# Patient Record
Sex: MALE | NOT HISPANIC OR LATINO | Employment: FULL TIME | URBAN - METROPOLITAN AREA
[De-identification: names, ages, dates, MRNs, and addresses within clinical notes are randomized per-mention and may not be internally consistent; named-entity substitution may affect disease eponyms.]

---

## 2023-03-29 ENCOUNTER — OFFICE VISIT (OUTPATIENT)
Dept: PRIMARY CARE | Facility: CLINIC | Age: 48
End: 2023-03-29
Payer: COMMERCIAL

## 2023-03-29 ENCOUNTER — LAB (OUTPATIENT)
Dept: LAB | Facility: LAB | Age: 48
End: 2023-03-29
Payer: COMMERCIAL

## 2023-03-29 VITALS
WEIGHT: 161.4 LBS | RESPIRATION RATE: 16 BRPM | HEART RATE: 85 BPM | HEIGHT: 65 IN | OXYGEN SATURATION: 98 % | BODY MASS INDEX: 26.89 KG/M2 | SYSTOLIC BLOOD PRESSURE: 111 MMHG | DIASTOLIC BLOOD PRESSURE: 75 MMHG

## 2023-03-29 DIAGNOSIS — Z12.11 COLON CANCER SCREENING: ICD-10-CM

## 2023-03-29 DIAGNOSIS — Z00.00 ANNUAL PHYSICAL EXAM: Primary | ICD-10-CM

## 2023-03-29 DIAGNOSIS — Z12.5 PROSTATE CANCER SCREENING: ICD-10-CM

## 2023-03-29 DIAGNOSIS — Z00.00 ANNUAL PHYSICAL EXAM: ICD-10-CM

## 2023-03-29 DIAGNOSIS — Z23 ENCOUNTER FOR IMMUNIZATION: ICD-10-CM

## 2023-03-29 LAB
ALANINE AMINOTRANSFERASE (SGPT) (U/L) IN SER/PLAS: 13 U/L (ref 10–52)
ANION GAP IN SER/PLAS: 14 MMOL/L (ref 10–20)
ASPARTATE AMINOTRANSFERASE (SGOT) (U/L) IN SER/PLAS: 14 U/L (ref 9–39)
CALCIUM (MG/DL) IN SER/PLAS: 9.8 MG/DL (ref 8.6–10.6)
CARBON DIOXIDE, TOTAL (MMOL/L) IN SER/PLAS: 29 MMOL/L (ref 21–32)
CHLORIDE (MMOL/L) IN SER/PLAS: 104 MMOL/L (ref 98–107)
CREATININE (MG/DL) IN SER/PLAS: 1.08 MG/DL (ref 0.5–1.3)
GFR MALE: 84 ML/MIN/1.73M2
GLUCOSE (MG/DL) IN SER/PLAS: 62 MG/DL (ref 74–99)
HEPATITIS C VIRUS AB PRESENCE IN SERUM: NONREACTIVE
POTASSIUM (MMOL/L) IN SER/PLAS: 4.4 MMOL/L (ref 3.5–5.3)
PROSTATE SPECIFIC ANTIGEN,SCREEN: 1.78 NG/ML (ref 0–4)
SODIUM (MMOL/L) IN SER/PLAS: 143 MMOL/L (ref 136–145)
THYROTROPIN (MIU/L) IN SER/PLAS BY DETECTION LIMIT <= 0.05 MIU/L: 0.62 MIU/L (ref 0.44–3.98)
UREA NITROGEN (MG/DL) IN SER/PLAS: 12 MG/DL (ref 6–23)

## 2023-03-29 PROCEDURE — 85027 COMPLETE CBC AUTOMATED: CPT

## 2023-03-29 PROCEDURE — 86803 HEPATITIS C AB TEST: CPT

## 2023-03-29 PROCEDURE — 84450 TRANSFERASE (AST) (SGOT): CPT

## 2023-03-29 PROCEDURE — 90471 IMMUNIZATION ADMIN: CPT | Performed by: FAMILY MEDICINE

## 2023-03-29 PROCEDURE — 84443 ASSAY THYROID STIM HORMONE: CPT

## 2023-03-29 PROCEDURE — 99386 PREV VISIT NEW AGE 40-64: CPT | Performed by: FAMILY MEDICINE

## 2023-03-29 PROCEDURE — 84153 ASSAY OF PSA TOTAL: CPT

## 2023-03-29 PROCEDURE — 36415 COLL VENOUS BLD VENIPUNCTURE: CPT

## 2023-03-29 PROCEDURE — 80048 BASIC METABOLIC PNL TOTAL CA: CPT

## 2023-03-29 PROCEDURE — 84460 ALANINE AMINO (ALT) (SGPT): CPT

## 2023-03-29 PROCEDURE — 4004F PT TOBACCO SCREEN RCVD TLK: CPT | Performed by: FAMILY MEDICINE

## 2023-03-29 PROCEDURE — 90715 TDAP VACCINE 7 YRS/> IM: CPT | Performed by: FAMILY MEDICINE

## 2023-03-29 ASSESSMENT — PATIENT HEALTH QUESTIONNAIRE - PHQ9
2. FEELING DOWN, DEPRESSED OR HOPELESS: NOT AT ALL
1. LITTLE INTEREST OR PLEASURE IN DOING THINGS: NOT AT ALL
SUM OF ALL RESPONSES TO PHQ9 QUESTIONS 1 AND 2: 0

## 2023-03-29 NOTE — PATIENT INSTRUCTIONS
Fasting labs.  Colonoscopy referral.  Nutritions referral.  Tetanus shot provided.    F/U 1 year: Annual physical.

## 2023-03-29 NOTE — PROGRESS NOTES
"Subjective   Patient ID: Giovanni Lockhart is a 48 y.o. male who presents for Annual Exam.    HPI   Initial visit.  Patient's health is described as good.  Regular dental visits: Yes.  Vision problems: No.  Corrective lenses: Yes.  Last eye exam within 1 year: No.  Hearing loss: No.  Requests audiology referral: No.  Immunizations up to date: No (needs tetanus)  Healthy diet: No.  Regular exercise: Yes.  Trying to lose weight: No (trying to gain weight).  Requests nutrition/weight loss referral: Yes.  Sexually active: Yes.  Using contraception: No.  Requests STD screening: No.  Colon cancer screening up to date: No.  Objective   /75   Pulse 85   Resp 16   Ht 1.638 m (5' 4.5\")   Wt 73.2 kg (161 lb 6.4 oz)   SpO2 98%   BMI 27.28 kg/m²   Physical Exam  Constitutional:       General: He is not in acute distress.     Appearance: Normal appearance.   HENT:      Head: Normocephalic.      Right Ear: Tympanic membrane normal.      Left Ear: Tympanic membrane normal.      Nose: Nose normal.      Mouth/Throat:      Pharynx: Oropharynx is clear. No oropharyngeal exudate or posterior oropharyngeal erythema.   Eyes:      Extraocular Movements: Extraocular movements intact.      Conjunctiva/sclera: Conjunctivae normal.      Pupils: Pupils are equal, round, and reactive to light.   Neck:      Vascular: No carotid bruit.   Cardiovascular:      Rate and Rhythm: Normal rate and regular rhythm.      Heart sounds: Normal heart sounds. No murmur heard.     No friction rub. No gallop.   Pulmonary:      Effort: Pulmonary effort is normal.      Breath sounds: Normal breath sounds. No wheezing, rhonchi or rales.   Abdominal:      General: Bowel sounds are normal. There is no distension.      Palpations: Abdomen is soft. There is no mass.      Tenderness: There is no abdominal tenderness. There is no guarding or rebound.   Lymphadenopathy:      Cervical: No cervical adenopathy.   Skin:     Coloration: Skin is not jaundiced or pale. "   Neurological:      General: No focal deficit present.      Mental Status: He is oriented to person, place, and time.   Psychiatric:         Mood and Affect: Mood normal.         Behavior: Behavior normal.     Assessment/Plan   Diagnoses and all orders for this visit:  Annual physical exam  -     CBC; Future  -     Basic Metabolic Panel; Future  -     Lipid Panel; Future  -     Alanine Aminotransferase; Future  -     Aspartate Aminotransferase; Future  -     TSH with reflex to Free T4 if abnormal; Future  -     Hepatitis C Antibody; Future  -     Referral to Nutrition Services; Future  Prostate cancer screening  -     Prostate Specific Antigen, Screen; Future  Colon cancer screening  -     Colonoscopy; Future  Encounter for immunization  -     Tdap vaccine, age 10 years and older  (BOOSTRIX)    Fasting labs.  Colonoscopy referral.  Nutritions referral.  Tetanus shot provided.    F/U 1 year: Annual physical.

## 2023-03-30 LAB
ERYTHROCYTE DISTRIBUTION WIDTH (RATIO) BY AUTOMATED COUNT: 13.4 % (ref 11.5–14.5)
ERYTHROCYTE MEAN CORPUSCULAR HEMOGLOBIN CONCENTRATION (G/DL) BY AUTOMATED: 32.7 G/DL (ref 32–36)
ERYTHROCYTE MEAN CORPUSCULAR VOLUME (FL) BY AUTOMATED COUNT: 96 FL (ref 80–100)
ERYTHROCYTES (10*6/UL) IN BLOOD BY AUTOMATED COUNT: 4.06 X10E12/L (ref 4.5–5.9)
HEMATOCRIT (%) IN BLOOD BY AUTOMATED COUNT: 39.1 % (ref 41–52)
HEMOGLOBIN (G/DL) IN BLOOD: 12.8 G/DL (ref 13.5–17.5)
LEUKOCYTES (10*3/UL) IN BLOOD BY AUTOMATED COUNT: 8.1 X10E9/L (ref 4.4–11.3)
NRBC (PER 100 WBCS) BY AUTOMATED COUNT: 0 /100 WBC (ref 0–0)
PLATELETS (10*3/UL) IN BLOOD AUTOMATED COUNT: 237 X10E9/L (ref 150–450)

## 2023-04-04 DIAGNOSIS — D64.9 ANEMIA, UNSPECIFIED TYPE: Primary | ICD-10-CM

## 2023-04-12 ENCOUNTER — LAB (OUTPATIENT)
Dept: LAB | Facility: LAB | Age: 48
End: 2023-04-12
Payer: COMMERCIAL

## 2023-04-12 DIAGNOSIS — D64.9 ANEMIA, UNSPECIFIED TYPE: ICD-10-CM

## 2023-04-12 DIAGNOSIS — Z00.00 ANNUAL PHYSICAL EXAM: ICD-10-CM

## 2023-04-12 LAB
CHOLESTEROL (MG/DL) IN SER/PLAS: 188 MG/DL (ref 0–199)
CHOLESTEROL IN HDL (MG/DL) IN SER/PLAS: 47.7 MG/DL
CHOLESTEROL/HDL RATIO: 3.9
ERYTHROCYTE DISTRIBUTION WIDTH (RATIO) BY AUTOMATED COUNT: 13.6 % (ref 11.5–14.5)
ERYTHROCYTE MEAN CORPUSCULAR HEMOGLOBIN CONCENTRATION (G/DL) BY AUTOMATED: 32.8 G/DL (ref 32–36)
ERYTHROCYTE MEAN CORPUSCULAR VOLUME (FL) BY AUTOMATED COUNT: 96 FL (ref 80–100)
ERYTHROCYTES (10*6/UL) IN BLOOD BY AUTOMATED COUNT: 4.1 X10E12/L (ref 4.5–5.9)
FERRITIN (UG/LL) IN SER/PLAS: 187 UG/L (ref 20–300)
HEMATOCRIT (%) IN BLOOD BY AUTOMATED COUNT: 39.3 % (ref 41–52)
HEMOGLOBIN (G/DL) IN BLOOD: 12.9 G/DL (ref 13.5–17.5)
IRON (UG/DL) IN SER/PLAS: 80 UG/DL (ref 35–150)
IRON BINDING CAPACITY (UG/DL) IN SER/PLAS: 295 UG/DL (ref 240–445)
IRON SATURATION (%) IN SER/PLAS: 27 % (ref 25–45)
LDL: 117 MG/DL (ref 0–99)
LEUKOCYTES (10*3/UL) IN BLOOD BY AUTOMATED COUNT: 7.9 X10E9/L (ref 4.4–11.3)
NRBC (PER 100 WBCS) BY AUTOMATED COUNT: 0 /100 WBC (ref 0–0)
PLATELETS (10*3/UL) IN BLOOD AUTOMATED COUNT: 247 X10E9/L (ref 150–450)
TRIGLYCERIDE (MG/DL) IN SER/PLAS: 116 MG/DL (ref 0–149)
VLDL: 23 MG/DL (ref 0–40)

## 2023-04-12 PROCEDURE — 83540 ASSAY OF IRON: CPT

## 2023-04-12 PROCEDURE — 36415 COLL VENOUS BLD VENIPUNCTURE: CPT

## 2023-04-12 PROCEDURE — 82746 ASSAY OF FOLIC ACID SERUM: CPT

## 2023-04-12 PROCEDURE — 82607 VITAMIN B-12: CPT

## 2023-04-12 PROCEDURE — 83550 IRON BINDING TEST: CPT

## 2023-04-12 PROCEDURE — 82728 ASSAY OF FERRITIN: CPT

## 2023-04-12 PROCEDURE — 85027 COMPLETE CBC AUTOMATED: CPT

## 2023-04-12 PROCEDURE — 80061 LIPID PANEL: CPT

## 2023-04-13 LAB
COBALAMIN (VITAMIN B12) (PG/ML) IN SER/PLAS: 385 PG/ML (ref 211–911)
FOLATE (NG/ML) IN SER/PLAS: 10 NG/ML

## 2023-05-03 ENCOUNTER — HOSPITAL ENCOUNTER (OUTPATIENT)
Dept: DATA CONVERSION | Facility: HOSPITAL | Age: 48
End: 2023-05-03
Attending: STUDENT IN AN ORGANIZED HEALTH CARE EDUCATION/TRAINING PROGRAM | Admitting: STUDENT IN AN ORGANIZED HEALTH CARE EDUCATION/TRAINING PROGRAM

## 2023-05-03 DIAGNOSIS — D12.2 BENIGN NEOPLASM OF ASCENDING COLON: ICD-10-CM

## 2023-05-03 DIAGNOSIS — Z12.11 ENCOUNTER FOR SCREENING FOR MALIGNANT NEOPLASM OF COLON: ICD-10-CM

## 2023-05-03 DIAGNOSIS — F17.290 NICOTINE DEPENDENCE, OTHER TOBACCO PRODUCT, UNCOMPLICATED: ICD-10-CM

## 2023-05-03 DIAGNOSIS — D12.4 BENIGN NEOPLASM OF DESCENDING COLON: ICD-10-CM

## 2023-05-03 DIAGNOSIS — D64.9 ANEMIA, UNSPECIFIED: ICD-10-CM

## 2023-05-03 DIAGNOSIS — Z88.0 ALLERGY STATUS TO PENICILLIN: ICD-10-CM

## 2023-05-11 LAB
COMPLETE PATHOLOGY REPORT: NORMAL
CONVERTED CLINICAL DIAGNOSIS-HISTORY: NORMAL
CONVERTED FINAL DIAGNOSIS: NORMAL
CONVERTED FINAL REPORT PDF LINK TO COPY AND PASTE: NORMAL
CONVERTED GROSS DESCRIPTION: NORMAL

## 2023-10-02 ENCOUNTER — OFFICE VISIT (OUTPATIENT)
Dept: PRIMARY CARE | Facility: CLINIC | Age: 48
End: 2023-10-02
Payer: COMMERCIAL

## 2023-10-02 VITALS
BODY MASS INDEX: 27.56 KG/M2 | TEMPERATURE: 97.1 F | DIASTOLIC BLOOD PRESSURE: 83 MMHG | RESPIRATION RATE: 16 BRPM | HEART RATE: 69 BPM | WEIGHT: 165.4 LBS | OXYGEN SATURATION: 98 % | HEIGHT: 65 IN | SYSTOLIC BLOOD PRESSURE: 130 MMHG

## 2023-10-02 DIAGNOSIS — R31.0 GROSS HEMATURIA: Primary | ICD-10-CM

## 2023-10-02 LAB
POC APPEARANCE, URINE: CLEAR
POC BILIRUBIN, URINE: NEGATIVE
POC BLOOD, URINE: ABNORMAL
POC COLOR, URINE: YELLOW
POC GLUCOSE, URINE: NEGATIVE MG/DL
POC KETONES, URINE: NEGATIVE MG/DL
POC LEUKOCYTES, URINE: NEGATIVE
POC NITRITE,URINE: NEGATIVE
POC PH, URINE: 7 PH
POC PROTEIN, URINE: NEGATIVE MG/DL
POC SPECIFIC GRAVITY, URINE: 1.01
POC UROBILINOGEN, URINE: 0.2 EU/DL

## 2023-10-02 PROCEDURE — 4004F PT TOBACCO SCREEN RCVD TLK: CPT | Performed by: FAMILY MEDICINE

## 2023-10-02 PROCEDURE — 99213 OFFICE O/P EST LOW 20 MIN: CPT | Performed by: FAMILY MEDICINE

## 2023-10-02 PROCEDURE — 81003 URINALYSIS AUTO W/O SCOPE: CPT | Performed by: FAMILY MEDICINE

## 2023-10-02 NOTE — PROGRESS NOTES
"Subjective   Patient ID: Giovanni Lockhart is a 48 y.o. male who presents for Blood in Urine.    HPI  Bright red blood in urine this morning.  2-3 drips on the floor.  \"Something\" came out and went into the toilet.  Looked like a scab, but not sure what color it was.   No blood or anything in subsequent urines.  Last PSA and GFR normal.    Review of Systems  No other complaints.     Objective   /83   Pulse 69   Temp 36.2 °C (97.1 °F)   Resp 16   Ht 1.638 m (5' 4.5\")   Wt 75 kg (165 lb 6.4 oz)   SpO2 98%   BMI 27.95 kg/m²     Physical Exam  Constitutional:       General: He is not in acute distress.     Appearance: Normal appearance.   Eyes:      Conjunctiva/sclera: Conjunctivae normal.   Abdominal:      Palpations: Abdomen is soft.      Tenderness: There is no abdominal tenderness. There is no right CVA tenderness or left CVA tenderness.   Skin:     Coloration: Skin is not jaundiced or pale.   Neurological:      Mental Status: He is oriented to person, place, and time.   Psychiatric:         Mood and Affect: Mood normal.         Behavior: Behavior normal.           Component  Ref Range & Units 11:15   POC Color, Urine  Straw, Yellow, Light-Yellow Yellow   POC Appearance, Urine  Clear Clear   POC Specific Gravity, Urine  1.005 - 1.035 1.010   POC PH, Urine  No Reference Range Established PH 7.0   POC Protein, Urine  NEGATIVE, 30 (1+) mg/dl NEGATIVE   POC Glucose, Urine  NEGATIVE mg/dl NEGATIVE   POC Blood, Urine  NEGATIVE SMALL (1+) Abnormal    POC Ketones, Urine  NEGATIVE mg/dl NEGATIVE   POC Bilirubin, Urine  NEGATIVE NEGATIVE   POC Urobilinogen, Urine  0.2, 1.0 EU/DL 0.2   Poc Nitrate, Urine  NEGATIVE NEGATIVE   POC Leukocytes, Urine  NEGATIVE NEGATIVE              Specimen Collected: 10/02/23 11:15           Assessment/Plan   Diagnoses and all orders for this visit:  Gross hematuria  -     POCT UA Automated manually resulted  -     Urine Culture  -     CT urography w 3D volume rendered imaging; Future  -  "    Referral to Urology; Future    Urine culture.  CT urogram.  Urology referral.    F/U 5 months: Annual wellness visit.

## 2023-10-03 LAB — BACTERIA UR CULT: NORMAL

## 2023-10-05 DIAGNOSIS — R31.0 GROSS HEMATURIA: Primary | ICD-10-CM

## 2024-10-11 ENCOUNTER — OFFICE VISIT (OUTPATIENT)
Dept: URGENT CARE | Age: 49
End: 2024-10-11
Payer: COMMERCIAL

## 2024-10-11 ENCOUNTER — APPOINTMENT (OUTPATIENT)
Dept: URGENT CARE | Age: 49
End: 2024-10-11
Payer: COMMERCIAL

## 2024-10-11 VITALS
SYSTOLIC BLOOD PRESSURE: 135 MMHG | DIASTOLIC BLOOD PRESSURE: 76 MMHG | OXYGEN SATURATION: 96 % | HEART RATE: 82 BPM | TEMPERATURE: 99.2 F

## 2024-10-11 DIAGNOSIS — R60.0 SUBMANDIBULAR GLAND SWELLING: ICD-10-CM

## 2024-10-11 DIAGNOSIS — R59.0 CERVICAL LYMPHADENOPATHY: Primary | ICD-10-CM

## 2024-10-11 LAB
POC RAPID MONO: NEGATIVE
POC RAPID STREP: NEGATIVE

## 2024-10-11 RX ORDER — DOXYCYCLINE 100 MG/1
100 CAPSULE ORAL 2 TIMES DAILY
Qty: 20 CAPSULE | Refills: 0 | Status: SHIPPED | OUTPATIENT
Start: 2024-10-11 | End: 2024-10-21

## 2024-10-11 NOTE — PROGRESS NOTES
Subjective   Patient ID: Giovanni Lockhart Jr. is a 49 y.o. male. They present today with a chief complaint of Illness.    History of Present Illness  Patient presents with swollen lymph nodes.  States it hurts to open his jaw all the way.  Denies mouth/tooth pain.  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  C/o fatigue.    Past Medical History  Allergies as of 10/11/2024 - Reviewed 10/11/2024   Allergen Reaction Noted    Ampicillin Hives and Unknown 03/23/2016    Penicillins Other 03/29/2023       (Not in a hospital admission)       History reviewed. No pertinent past medical history.    Past Surgical History:   Procedure Laterality Date    LIP REPAIR      as a child        reports that he has been smoking cigars. He started smoking about 25 years ago. He has never used smokeless tobacco. He reports current alcohol use.    Review of Systems  Review of Systems                               Objective    Vitals:    10/11/24 1607   BP: 135/76   Pulse: 82   Temp: 37.3 °C (99.2 °F)   SpO2: 96%     No LMP for male patient.    Physical Exam  CONSTITUTIONAL: The general appearance and condition of the patient were examined.  Level of distress, nutrition, external development abnormality, and general behavior were noted.  No abnormal findings.  Vital signs as documented.      EYES: Patient examined for extra ocular movements, symmetry of pupils, and reactivity to light.        ENT: External ears: left ear normal ; right ear normal. Bilateral ears have bulging TM's.  Normal external ear exam.  Erythema with pebbling to throat.         Lymph: Bilateral cervical lymph edema.  Submandibular lymph node swelling.     CARDIOVASCULAR: The patient's heart examined for regular rate and rhythm and presence of murmurs. Note taken of any tachycardia, bradycardia or any irregular rhythm.  No abnormal findings.        RESPIRATORY/LUNGS: Chest examined for equal movement, bilaterally.  Lungs examined for equality of breath  sounds. Presence or absence of rales noted bilaterally.  Examined for the presence of diffuse or scattered wheezes.  No abnormal findings.     Procedures    Point of Care Test & Imaging Results from this visit  No results found for this visit on 10/11/24.   No results found.    Diagnostic study results (if any) were reviewed by MARIANELA Quiles.    Assessment/Plan   Allergies, medications, history, and pertinent labs/EKGs/Imaging reviewed by MARIANELA Quiles.     Medical Decision Making    At time of discharge patient was clinically well-appearing and HDS for outpatient management. The patient and/or family was educated regarding diagnosis, supportive care, OTC and Rx medications. The patient and/or family was given the opportunity to ask questions prior to discharge.  They verbalized understanding of my discussion of the plans for treatment, expected course, indications to return to  or seek further evaluation in ED, and the need for timely follow up as directed.   They were provided with a work/school excuse if requested.      Orders and Diagnoses  There are no diagnoses linked to this encounter.    Medical Admin Record      Patient disposition: Home    Electronically signed by MARIANELA Quiles  4:09 PM

## 2024-10-14 ENCOUNTER — OFFICE VISIT (OUTPATIENT)
Dept: PRIMARY CARE | Facility: CLINIC | Age: 49
End: 2024-10-14
Payer: COMMERCIAL

## 2024-10-14 VITALS
HEART RATE: 77 BPM | BODY MASS INDEX: 27.32 KG/M2 | SYSTOLIC BLOOD PRESSURE: 136 MMHG | OXYGEN SATURATION: 98 % | HEIGHT: 65 IN | DIASTOLIC BLOOD PRESSURE: 64 MMHG | WEIGHT: 164 LBS | RESPIRATION RATE: 16 BRPM

## 2024-10-14 DIAGNOSIS — R59.0 SUBMANDIBULAR LYMPHADENOPATHY: Primary | ICD-10-CM

## 2024-10-14 DIAGNOSIS — R68.84 JAW PAIN: ICD-10-CM

## 2024-10-14 PROCEDURE — 99214 OFFICE O/P EST MOD 30 MIN: CPT | Performed by: FAMILY MEDICINE

## 2024-10-14 PROCEDURE — 3008F BODY MASS INDEX DOCD: CPT | Performed by: FAMILY MEDICINE

## 2024-10-14 RX ORDER — NAPROXEN 500 MG/1
500 TABLET ORAL 2 TIMES DAILY PRN
Qty: 30 TABLET | Refills: 0 | Status: SHIPPED | OUTPATIENT
Start: 2024-10-14 | End: 2024-10-22 | Stop reason: HOSPADM

## 2024-10-14 RX ORDER — CEFDINIR 300 MG/1
300 CAPSULE ORAL 2 TIMES DAILY
Qty: 14 CAPSULE | Refills: 0 | Status: SHIPPED | OUTPATIENT
Start: 2024-10-14 | End: 2024-10-22 | Stop reason: HOSPADM

## 2024-10-14 NOTE — PATIENT INSTRUCTIONS
Discussed DDx including reactive lymphadenopathy vs parotid etiology vs bony etiology vs TMJ.  Change Doxycycline to Cefdinir.  Naprosyn as needed (avoid other NSAIDs while taking Naprosyn).  Consider warm compression right jaw as needed.  Will consider imaging (ie lymph node vs x-ray mandible) if symptoms persist.  Call, send message through [employer], or return to office prn if these symptoms worsen or fail to improve as anticipated.      Schedule annual wellness visit.

## 2024-10-14 NOTE — PROGRESS NOTES
"Subjective   Patient ID: Giovanni Lockhart Jr. is a 49 y.o. male who presents for Swollen Glands.    HPI   Right submandibular lymph node x 6 days, increased in size since onset.  Went to urgent care 3 days ago, negative strep and mono test, Tx w/Doxy (taking as directed).  No improvement since urgent care visit.  Allergic to PCN (hives, no anaphylaxis).  Reports right mandible pain only when opening mouth to eat.  No issues when opening mouth to talk.  No jaw clicking or popping.  No dental pain.  Has slight cough.  Had rhinorrhea (completely resolved).  No nasal congestion, post nasal gtt, sinus pressure, sore throat, SOB, wheeze, fever, chills, loss of smell or taste, nausea, vomiting, diarrhea, headaches, body aches.  Tried Aleve, Motrin.  Negative home covid test 2 days ago.    Review of Systems  No other complaints.     Objective   /64   Pulse 77   Resp 16   Ht 1.638 m (5' 4.5\")   Wt 74.4 kg (164 lb)   SpO2 98%   BMI 27.72 kg/m²     Physical Exam  Constitutional:       General: He is not in acute distress.     Appearance: He is overweight.   HENT:      Head: Normocephalic.      Comments: No TMJ tenderness, No mandibular tenderness, No parotid gland tenderness     Right Ear: Tympanic membrane normal.      Left Ear: Tympanic membrane normal.      Mouth/Throat:      Pharynx: Oropharynx is clear. No oropharyngeal exudate or posterior oropharyngeal erythema.      Comments: Unable to fully open mouth, No hot potato voice  Neck:      Comments: Mildly tender right submandibular LAD  Cardiovascular:      Rate and Rhythm: Normal rate and regular rhythm.      Heart sounds: Normal heart sounds. No murmur heard.     No friction rub. No gallop.   Pulmonary:      Effort: Pulmonary effort is normal.      Breath sounds: Normal breath sounds. No wheezing, rhonchi or rales.   Neurological:      Mental Status: He is oriented to person, place, and time.   Psychiatric:         Mood and Affect: Mood normal.         Behavior: " Behavior normal.     Assessment/Plan   Diagnoses and all orders for this visit:  Submandibular lymphadenopathy  -     cefdinir (Omnicef) 300 mg capsule; Take 1 capsule (300 mg) by mouth 2 times a day for 7 days.  Jaw pain  -     naproxen (Naprosyn) 500 mg tablet; Take 1 tablet (500 mg) by mouth 2 times a day as needed for mild pain (1 - 3) or moderate pain (4 - 6).    Discussed DDx including reactive lymphadenopathy vs parotid etiology vs bony etiology vs TMJ.  Change Doxycycline to Cefdinir.  Naprosyn as needed (avoid other NSAIDs while taking Naprosyn).  Consider warm compression right jaw as needed.  Will consider imaging (ie lymph node vs x-ray mandible) if symptoms persist.  Call, send message through [employer], or return to office prn if these symptoms worsen or fail to improve as anticipated.      Schedule annual wellness visit.

## 2024-10-16 ENCOUNTER — TELEMEDICINE (OUTPATIENT)
Dept: PRIMARY CARE | Facility: CLINIC | Age: 49
End: 2024-10-16
Payer: COMMERCIAL

## 2024-10-16 DIAGNOSIS — K11.20 PAROTIDITIS: Primary | ICD-10-CM

## 2024-10-16 PROCEDURE — 99213 OFFICE O/P EST LOW 20 MIN: CPT | Performed by: NURSE PRACTITIONER

## 2024-10-16 ASSESSMENT — ENCOUNTER SYMPTOMS
FATIGUE: 0
VOMITING: 0
HEADACHES: 0
LIGHT-HEADEDNESS: 0
FEVER: 0
CHEST TIGHTNESS: 0
SHORTNESS OF BREATH: 0
FACIAL SWELLING: 1
APPETITE CHANGE: 0
ACTIVITY CHANGE: 0
NAUSEA: 0
DIZZINESS: 0
TROUBLE SWALLOWING: 0

## 2024-10-16 NOTE — PATIENT INSTRUCTIONS
Pleasure meeting with you today!    Let me know if you need anything.     Please send me a MyChart message if you have any questions or concerns.  FOR NON URGENT questions only.  Allow up to 72 hours for response.     If you have prescription issues or other questions you can email   Andrei Blackburn,  Digital Health Coordinator, at   [email or web address]

## 2024-10-16 NOTE — PROGRESS NOTES
Subjective   Patient ID: Giovanni Lockhart Jr. is a 49 y.o. male who presents for No chief complaint on file..    Right sided jaw/chin/neck area    Was seen by UC; testing was negative for strep etc, started on doxycycline, then saw PCP on 10/14/2024 who changed antibiotic, patient concerned because he still has swelling  PCP plan 10/14/24  Discussed DDx including reactive lymphadenopathy vs parotid etiology vs bony etiology vs TMJ.  Change Doxycycline to Cefdinir.  Naprosyn as needed (avoid other NSAIDs while taking Naprosyn).  Consider warm compression right jaw as needed.  Will consider imaging (ie lymph node vs x-ray mandible) if symptoms persist.             Review of Systems   Constitutional:  Negative for activity change, appetite change, fatigue and fever.   HENT:  Positive for facial swelling. Negative for trouble swallowing.    Respiratory:  Negative for chest tightness and shortness of breath.    Cardiovascular:  Negative for chest pain.   Gastrointestinal:  Negative for nausea and vomiting.   Neurological:  Negative for dizziness, light-headedness and headaches.       Objective   There were no vitals taken for this visit.    Physical Exam  Constitutional:       General: He is not in acute distress.     Appearance: Normal appearance. He is not ill-appearing.      Comments: On Demand Virtual Visit Patient Consent     An interactive audio and video telecommunication system which permits real time communications between the patient (at the originating site) and provider (at the distant site) was utilized to provide this telehealth service.   Verbal consent was requested and obtained from Giovanni Lockhart Jr. (or parent if under 18) on this date, 03/27/24 for a telehealth visit.   I have verbally confirmed with Giovanni Lockhart Jr. (or parent if under 18) that they are physically located in the [facility] during this virtual visit.    I performed this visit using realtime telehealth tools, including an audio/video OR  telephone connection between the patient listed who was located [place] the [place] and myself, Ayo Toledo CNP (licensed in the [facility]).  At the start of the visit, I introduced myself as Ayo Toledo, Nurse practitioner and verified the patients name, , and current physical location.    If they were currently outside of the state of [place], the visit was ended and the patient was referred to alternative means for evaluation and treatment.   The patient was made aware of the limitations of the telehealth visit.  They will not be physically examined and all issues may not be appropriate for a telehealth visit.  If necessary, an in person referral will be made.       DISCLAIMER:   In preparing for this visit and writing this note, I reviewed previous electronic medical records (labs, imaging and medical charts) available.  Significant findings which helped in decision making are recorded in this encounter charting.    Telemedicine appropriate evaluation completed.  Unable to perform complete physical exam due to virtual visit, patient was visualized on interactive video.      HENT:      Head:      Jaw: Swelling (right) present.   Pulmonary:      Effort: Pulmonary effort is normal.   Neurological:      Mental Status: He is alert and oriented to person, place, and time.         Assessment/Plan   Diagnoses and all orders for this visit:  Parotiditis  -complete previously prescribed antibiotic  Apply warm compress  Gentle massage to the area  Such on sour candy/lemon candy    Follow up with PCP if symptoms persist  Written education [facility] in [facility]

## 2024-10-18 ENCOUNTER — APPOINTMENT (OUTPATIENT)
Dept: RADIOLOGY | Facility: HOSPITAL | Age: 49
End: 2024-10-18
Payer: COMMERCIAL

## 2024-10-18 ENCOUNTER — HOSPITAL ENCOUNTER (EMERGENCY)
Facility: HOSPITAL | Age: 49
Discharge: HOME | End: 2024-10-18
Payer: COMMERCIAL

## 2024-10-18 ENCOUNTER — TELEMEDICINE (OUTPATIENT)
Dept: PRIMARY CARE | Facility: CLINIC | Age: 49
End: 2024-10-18
Payer: COMMERCIAL

## 2024-10-18 VITALS
TEMPERATURE: 98.6 F | BODY MASS INDEX: 27.88 KG/M2 | OXYGEN SATURATION: 97 % | SYSTOLIC BLOOD PRESSURE: 132 MMHG | RESPIRATION RATE: 18 BRPM | WEIGHT: 165 LBS | HEART RATE: 74 BPM | DIASTOLIC BLOOD PRESSURE: 96 MMHG

## 2024-10-18 DIAGNOSIS — K04.7 DENTAL ABSCESS: Primary | ICD-10-CM

## 2024-10-18 DIAGNOSIS — R25.2 TRISMUS: Primary | ICD-10-CM

## 2024-10-18 DIAGNOSIS — R22.1 LOCALIZED SWELLING, MASS AND LUMP, NECK: ICD-10-CM

## 2024-10-18 LAB
ALBUMIN SERPL BCP-MCNC: 4.1 G/DL (ref 3.4–5)
ALP SERPL-CCNC: 98 U/L (ref 33–120)
ALT SERPL W P-5'-P-CCNC: 15 U/L (ref 10–52)
ANION GAP SERPL CALC-SCNC: 11 MMOL/L (ref 10–20)
AST SERPL W P-5'-P-CCNC: 20 U/L (ref 9–39)
BASOPHILS # BLD AUTO: 0.01 X10*3/UL (ref 0–0.1)
BASOPHILS NFR BLD AUTO: 0.1 %
BILIRUB SERPL-MCNC: 0.3 MG/DL (ref 0–1.2)
BUN SERPL-MCNC: 12 MG/DL (ref 6–23)
CALCIUM SERPL-MCNC: 9 MG/DL (ref 8.6–10.3)
CHLORIDE SERPL-SCNC: 101 MMOL/L (ref 98–107)
CO2 SERPL-SCNC: 30 MMOL/L (ref 21–32)
CREAT SERPL-MCNC: 1.01 MG/DL (ref 0.5–1.3)
EGFRCR SERPLBLD CKD-EPI 2021: >90 ML/MIN/1.73M*2
EOSINOPHIL # BLD AUTO: 0.21 X10*3/UL (ref 0–0.7)
EOSINOPHIL NFR BLD AUTO: 2.1 %
ERYTHROCYTE [DISTWIDTH] IN BLOOD BY AUTOMATED COUNT: 13.2 % (ref 11.5–14.5)
GLUCOSE SERPL-MCNC: 77 MG/DL (ref 74–99)
HCT VFR BLD AUTO: 36 % (ref 41–52)
HGB BLD-MCNC: 12.3 G/DL (ref 13.5–17.5)
IMM GRANULOCYTES # BLD AUTO: 0.04 X10*3/UL (ref 0–0.7)
IMM GRANULOCYTES NFR BLD AUTO: 0.4 % (ref 0–0.9)
LYMPHOCYTES # BLD AUTO: 2.68 X10*3/UL (ref 1.2–4.8)
LYMPHOCYTES NFR BLD AUTO: 27.3 %
MCH RBC QN AUTO: 32.7 PG (ref 26–34)
MCHC RBC AUTO-ENTMCNC: 34.2 G/DL (ref 32–36)
MCV RBC AUTO: 96 FL (ref 80–100)
MONOCYTES # BLD AUTO: 0.97 X10*3/UL (ref 0.1–1)
MONOCYTES NFR BLD AUTO: 9.9 %
NEUTROPHILS # BLD AUTO: 5.92 X10*3/UL (ref 1.2–7.7)
NEUTROPHILS NFR BLD AUTO: 60.2 %
NRBC BLD-RTO: 0 /100 WBCS (ref 0–0)
PLATELET # BLD AUTO: 301 X10*3/UL (ref 150–450)
POTASSIUM SERPL-SCNC: 3.8 MMOL/L (ref 3.5–5.3)
PROT SERPL-MCNC: 7.7 G/DL (ref 6.4–8.2)
RBC # BLD AUTO: 3.76 X10*6/UL (ref 4.5–5.9)
SODIUM SERPL-SCNC: 138 MMOL/L (ref 136–145)
WBC # BLD AUTO: 9.8 X10*3/UL (ref 4.4–11.3)

## 2024-10-18 PROCEDURE — 2500000001 HC RX 250 WO HCPCS SELF ADMINISTERED DRUGS (ALT 637 FOR MEDICARE OP): Performed by: PHYSICIAN ASSISTANT

## 2024-10-18 PROCEDURE — 99214 OFFICE O/P EST MOD 30 MIN: CPT | Performed by: FAMILY MEDICINE

## 2024-10-18 PROCEDURE — 70491 CT SOFT TISSUE NECK W/DYE: CPT

## 2024-10-18 PROCEDURE — 2550000001 HC RX 255 CONTRASTS: Performed by: PHYSICIAN ASSISTANT

## 2024-10-18 PROCEDURE — 99284 EMERGENCY DEPT VISIT MOD MDM: CPT | Mod: 25

## 2024-10-18 PROCEDURE — 96375 TX/PRO/DX INJ NEW DRUG ADDON: CPT | Mod: 59

## 2024-10-18 PROCEDURE — 70491 CT SOFT TISSUE NECK W/DYE: CPT | Performed by: RADIOLOGY

## 2024-10-18 PROCEDURE — 96374 THER/PROPH/DIAG INJ IV PUSH: CPT | Mod: 59

## 2024-10-18 PROCEDURE — 85025 COMPLETE CBC W/AUTO DIFF WBC: CPT | Performed by: PHYSICIAN ASSISTANT

## 2024-10-18 PROCEDURE — 2500000004 HC RX 250 GENERAL PHARMACY W/ HCPCS (ALT 636 FOR OP/ED): Performed by: PHYSICIAN ASSISTANT

## 2024-10-18 PROCEDURE — 80053 COMPREHEN METABOLIC PANEL: CPT | Performed by: PHYSICIAN ASSISTANT

## 2024-10-18 RX ORDER — CLINDAMYCIN HYDROCHLORIDE 150 MG/1
450 CAPSULE ORAL ONCE
Status: COMPLETED | OUTPATIENT
Start: 2024-10-18 | End: 2024-10-18

## 2024-10-18 RX ORDER — IBUPROFEN 600 MG/1
600 TABLET ORAL EVERY 6 HOURS PRN
Qty: 20 TABLET | Refills: 0 | Status: SHIPPED | OUTPATIENT
Start: 2024-10-18 | End: 2024-10-22 | Stop reason: HOSPADM

## 2024-10-18 RX ORDER — CLINDAMYCIN HYDROCHLORIDE 150 MG/1
450 CAPSULE ORAL 3 TIMES DAILY
Qty: 90 CAPSULE | Refills: 0 | Status: SHIPPED | OUTPATIENT
Start: 2024-10-18 | End: 2024-10-18

## 2024-10-18 RX ORDER — ACETAMINOPHEN 325 MG/1
650 TABLET ORAL EVERY 6 HOURS PRN
Qty: 20 TABLET | Refills: 0 | Status: SHIPPED | OUTPATIENT
Start: 2024-10-18 | End: 2024-10-18

## 2024-10-18 RX ORDER — ACETAMINOPHEN 325 MG/1
650 TABLET ORAL EVERY 6 HOURS PRN
Qty: 20 TABLET | Refills: 0 | Status: SHIPPED | OUTPATIENT
Start: 2024-10-18 | End: 2024-10-22 | Stop reason: HOSPADM

## 2024-10-18 RX ORDER — KETOROLAC TROMETHAMINE 30 MG/ML
15 INJECTION, SOLUTION INTRAMUSCULAR; INTRAVENOUS ONCE
Status: COMPLETED | OUTPATIENT
Start: 2024-10-18 | End: 2024-10-18

## 2024-10-18 RX ORDER — CLINDAMYCIN HYDROCHLORIDE 150 MG/1
450 CAPSULE ORAL 3 TIMES DAILY
Qty: 90 CAPSULE | Refills: 0 | Status: SHIPPED | OUTPATIENT
Start: 2024-10-18 | End: 2024-10-22 | Stop reason: HOSPADM

## 2024-10-18 RX ORDER — IBUPROFEN 600 MG/1
600 TABLET ORAL EVERY 6 HOURS PRN
Qty: 20 TABLET | Refills: 0 | Status: SHIPPED | OUTPATIENT
Start: 2024-10-18 | End: 2024-10-18

## 2024-10-18 RX ORDER — DEXAMETHASONE SODIUM PHOSPHATE 10 MG/ML
10 INJECTION INTRAMUSCULAR; INTRAVENOUS ONCE
Status: COMPLETED | OUTPATIENT
Start: 2024-10-18 | End: 2024-10-18

## 2024-10-18 ASSESSMENT — COLUMBIA-SUICIDE SEVERITY RATING SCALE - C-SSRS
1. IN THE PAST MONTH, HAVE YOU WISHED YOU WERE DEAD OR WISHED YOU COULD GO TO SLEEP AND NOT WAKE UP?: NO
2. HAVE YOU ACTUALLY HAD ANY THOUGHTS OF KILLING YOURSELF?: NO
6. HAVE YOU EVER DONE ANYTHING, STARTED TO DO ANYTHING, OR PREPARED TO DO ANYTHING TO END YOUR LIFE?: NO

## 2024-10-18 ASSESSMENT — PAIN SCALES - GENERAL
PAINLEVEL_OUTOF10: 9
PAINLEVEL_OUTOF10: 0 - NO PAIN

## 2024-10-18 ASSESSMENT — PAIN - FUNCTIONAL ASSESSMENT
PAIN_FUNCTIONAL_ASSESSMENT: 0-10
PAIN_FUNCTIONAL_ASSESSMENT: 0-10

## 2024-10-18 ASSESSMENT — PAIN DESCRIPTION - LOCATION: LOCATION: JAW

## 2024-10-18 ASSESSMENT — PAIN DESCRIPTION - PAIN TYPE: TYPE: ACUTE PAIN

## 2024-10-18 NOTE — ED PROVIDER NOTES
This is a 49-year-old male who presents to the ED with no significant past medical history presents to the ED for right sided facial swelling that has been occurring for the past 10 days and has been gradually worsening.  He was initially treated for this with doxycycline and then was switched to cefdinir which he has been taking.  He states that his swelling has been worsening.  He is having difficulty opening his mouth due to the swelling.  He denies any difficulty swallowing or change to his voice.  He denies any fevers or chills.  He does endorse having right lower dental pain with a broken molar.  He does not follow with a dentist regularly.  He denies any other symptoms or complaints.  He has been able to tolerate p.o. intake.  He states that he has been taking his cefdinir as prescribed.      History provided by:  Patient   used: No             Visit Vitals  BP (!) 132/96   Pulse 74   Temp 37 °C (98.6 °F) (Temporal)   Resp 18   Wt 74.8 kg (165 lb)   SpO2 97%   BMI 27.88 kg/m²   Smoking Status Some Days   BSA 1.84 m²          Physical Exam     Physical exam:   General: Vitals noted, no distress. Afebrile.   EENT:  Hearing grossly intact. Normal phonation. MMM. Airway patient. PERRL. EOMI. soft tissue swelling noted to the angle of the mandible on the right side.  No excess warmth or erythema noted.  Normal phonation.  Tolerating own secretions.  2 finger trismus.  Neck: No midline tenderness or paraspinal tenderness. FROM.   Cardiac: Regular, rate, rhythm. Normal S1 and S2.  No murmurs, gallops, rubs.   Pulmonary: Good air exchange. Lungs clear bilaterally. No wheezes, rhonchi, rales. No accessory muscle use.   Extremities: No peripheral edema.  Full range of motion. Moves all extremities freely.   Skin: No rash. Warm and Dry.   Neuro: No focal neurologic deficits. CN 2-12 grossly intact. Sensation equal bilaterally. No weakness.         Labs Reviewed   CBC WITH AUTO DIFFERENTIAL - Abnormal        Result Value    WBC 9.8      nRBC 0.0      RBC 3.76 (*)     Hemoglobin 12.3 (*)     Hematocrit 36.0 (*)     MCV 96      MCH 32.7      MCHC 34.2      RDW 13.2      Platelets 301      Neutrophils % 60.2      Immature Granulocytes %, Automated 0.4      Lymphocytes % 27.3      Monocytes % 9.9      Eosinophils % 2.1      Basophils % 0.1      Neutrophils Absolute 5.92      Immature Granulocytes Absolute, Automated 0.04      Lymphocytes Absolute 2.68      Monocytes Absolute 0.97      Eosinophils Absolute 0.21      Basophils Absolute 0.01     COMPREHENSIVE METABOLIC PANEL - Normal    Glucose 77      Sodium 138      Potassium 3.8      Chloride 101      Bicarbonate 30      Anion Gap 11      Urea Nitrogen 12      Creatinine 1.01      eGFR >90      Calcium 9.0      Albumin 4.1      Alkaline Phosphatase 98      Total Protein 7.7      AST 20      Bilirubin, Total 0.3      ALT 15         CT soft tissue neck w IV contrast   Final Result   1. Findings thought to most likely represent large area of   phlegmonous change/developing abscess along the inner aspect of the   posterior body and ramus of the right mandible. There is prominent   dental caries with periapical lucency associated with the   incompletely erupted right 3rd mandibular molar with loss of cortex   along the lingual surface of the mandible in this region. This is   favored to reflect periapical abscess formation.   2. There is poor dentition with numerous missing teeth. There is   prominent dental caries and periapical lucency associated with   numerous residual teeth.   3. Nonspecific cervical lymphadenopathy is favored to be reactive in   nature.   4. Thyromegaly. Correlation with laboratory values is recommended.                  MACRO:   None        Signed by: Carrie Tello 10/18/2024 6:58 PM   Dictation workstation:   [id number]              ED Course & MDM     Medical Decision Making  This is a 49-year-old male who presents to the ED with right sided  facial pain/swelling has been increasing over the past 10 days despite being on doxycycline and cefdinir.  Vital stable upon arrival to the ED.  On examination he did have soft tissues swelling noted to the angle of the mandible on the right side.  No excess warmth or erythema.  He did have 2 finger trismus.  Patient did have poor dentition throughout with multiple broken teeth and dental caries.  Tenderness over the remainder of the right posterior molars.  Examination otherwise unremarkable.  Patient was able to tolerate his own secretions and had normal phonation.  CT neck with IV contrast ordered to evaluate for abscess.  Patient ordered Decadron and Toradol for his symptoms.  On my reassessment patient was feeling significantly improved.  He no longer had any trismus.  He feels that the swelling has decreased.  CBC and CMP both grossly unremarkable.  CT scan of his neck are concerning for area of phlegmonous change concerning for developing abscess as well as a periapical abscess.  I discussed these results with the patient and his family members at length.  He has an appointment scheduled with the dental clinic for early next week.  I discussed attempting I&D at this time versus trying different antibiotics and close follow-up.  Patient elected to not have an I&D at this time and to trial a different antibiotic and follow-up closely as an outpatient with strict return precautions.  Patient was able to tolerate p.o. intake at this time.  He was given dental clinic follow-up information if he is unable to make it to his own appointment he has already needed.  He was given a dose of clindamycin here in the ED and was written prescriptions for Motrin, Tylenol, and clindamycin to go home with.  He was agreeable to this plan.  He had no further questions at this time and was discharged from emergency department in stable condition with strict return precautions.    Amount and/or Complexity of Data Reviewed  Labs:  ordered.  Radiology: ordered and independent interpretation performed.     Details: CT neck with phlegmonous changes around the right mandibular ramus.    Risk  Prescription drug management.        Diagnoses as of 10/18/24 1944   Dental abscess       Patient was seen independently    Procedures    RAO Fong, TAMMI Evans PA-C  10/18/24 1944

## 2024-10-18 NOTE — PROGRESS NOTES
I performed this visit using realtime telehealth tools, including an audio/video OR telephone connection between the patient listed who was located in the [place] and myself, Jada Winkler (Board certified in the [facility]).  At the start of the visit, I introduced myself as Dr. Maguire and verified the patients name, , and current physical location.    If they were currently outside of the state of [place], the visit was ended and the patient was referred to alternative means for evaluation and treatment.   The patient was made aware of the limitations of the telehealth visit.  They will not be physically examined and all issues may not be appropriate for a telehealth visit.  If necessary, an in person referral will be made.      DISCLAIMER:   In preparing for this visit and writing this note, I reviewed previous electronic medical records (labs, imaging and medical charts) of the patient available in the physician portal. Significant findings which helped in decision making are recorded in this encounter charting.    All allergies were reviewed with the patient and all medications reconciled with the patient.    Had LN swelling x 3 days--then could not open mouth all the way--went to     10/11--treated for strep with doxy-- despite strep POCT (-) in office-- rapid mono (-)--had trismus at that visit although not documented in chart    10/14 --FM at [place]-- it is documented that he was unable to fully open mouth at that visit-- trismus--changed abx to omnicef--to treat swollen LN?     10/16-right jaw swelling--parotitis dx-- stay on the omnicef--warm compresses and lemon drops--    Now eating sour strips-- no relief    NO fever chills aches  No ST but a little achy now--on that side of neck  No facial pain  No URI sxs recently  LN-- feels like a golf ball now-painful  Supposed to go on vacation Tuesday--  Has dentures--was very painful to try to put them in    All other ROS (-)    Alert in  NAD  Eyes clear  No resp distress or coughing  Affect wnl  + swelling right -- by angle of mandible  +trismus-- can get mouth about 2 fingerwidths    Swelling by angle of mandible--+ trismus getting worse  high suspicion of abscess or mass in the area of the swelling inhibiting the ability to fully open his mouth--this requires CT scan to evaluate the area-  To ED-[facility] for imaging and evaluation  Patient and his GF agree and plan to go tonight    Telemedicine limits the ability to do a complete and accurate physical exam.     At the completion of the visit, possible diagnoses and plan was discussed with the patient as well as recommended treatments, medications prescribed, and when to follow up for in person evaluation. All questions were answered and the patient verbalized understanding.

## 2024-10-20 ENCOUNTER — HOSPITAL ENCOUNTER (INPATIENT)
Facility: HOSPITAL | Age: 49
LOS: 1 days | Discharge: HOME | DRG: 581 | End: 2024-10-22
Attending: EMERGENCY MEDICINE | Admitting: DENTIST
Payer: COMMERCIAL

## 2024-10-20 ENCOUNTER — ANESTHESIA EVENT (OUTPATIENT)
Dept: OPERATING ROOM | Facility: HOSPITAL | Age: 49
DRG: 581 | End: 2024-10-20
Payer: COMMERCIAL

## 2024-10-20 ENCOUNTER — ANESTHESIA (OUTPATIENT)
Dept: OPERATING ROOM | Facility: HOSPITAL | Age: 49
DRG: 581 | End: 2024-10-20
Payer: COMMERCIAL

## 2024-10-20 DIAGNOSIS — L02.11 ABSCESS OF NECK: Primary | ICD-10-CM

## 2024-10-20 LAB
ABO GROUP (TYPE) IN BLOOD: NORMAL
ALBUMIN SERPL BCP-MCNC: 4.3 G/DL (ref 3.4–5)
ALP SERPL-CCNC: 91 U/L (ref 33–120)
ALT SERPL W P-5'-P-CCNC: 36 U/L (ref 10–52)
ANION GAP SERPL CALC-SCNC: 13 MMOL/L (ref 10–20)
ANTIBODY SCREEN: NORMAL
AST SERPL W P-5'-P-CCNC: 23 U/L (ref 9–39)
BASOPHILS # BLD AUTO: 0.02 X10*3/UL (ref 0–0.1)
BASOPHILS NFR BLD AUTO: 0.2 %
BILIRUB SERPL-MCNC: 0.3 MG/DL (ref 0–1.2)
BUN SERPL-MCNC: 13 MG/DL (ref 6–23)
CALCIUM SERPL-MCNC: 9.8 MG/DL (ref 8.6–10.6)
CHLORIDE SERPL-SCNC: 103 MMOL/L (ref 98–107)
CO2 SERPL-SCNC: 29 MMOL/L (ref 21–32)
CREAT SERPL-MCNC: 1.26 MG/DL (ref 0.5–1.3)
EGFRCR SERPLBLD CKD-EPI 2021: 69 ML/MIN/1.73M*2
EOSINOPHIL # BLD AUTO: 0.12 X10*3/UL (ref 0–0.7)
EOSINOPHIL NFR BLD AUTO: 1.1 %
ERYTHROCYTE [DISTWIDTH] IN BLOOD BY AUTOMATED COUNT: 13.5 % (ref 11.5–14.5)
GLUCOSE SERPL-MCNC: 79 MG/DL (ref 74–99)
HCT VFR BLD AUTO: 36.7 % (ref 41–52)
HGB BLD-MCNC: 11.7 G/DL (ref 13.5–17.5)
IMM GRANULOCYTES # BLD AUTO: 0.06 X10*3/UL (ref 0–0.7)
IMM GRANULOCYTES NFR BLD AUTO: 0.5 % (ref 0–0.9)
LYMPHOCYTES # BLD AUTO: 2.73 X10*3/UL (ref 1.2–4.8)
LYMPHOCYTES NFR BLD AUTO: 24.2 %
MCH RBC QN AUTO: 31.2 PG (ref 26–34)
MCHC RBC AUTO-ENTMCNC: 31.9 G/DL (ref 32–36)
MCV RBC AUTO: 98 FL (ref 80–100)
MONOCYTES # BLD AUTO: 1.06 X10*3/UL (ref 0.1–1)
MONOCYTES NFR BLD AUTO: 9.4 %
NEUTROPHILS # BLD AUTO: 7.31 X10*3/UL (ref 1.2–7.7)
NEUTROPHILS NFR BLD AUTO: 64.6 %
NRBC BLD-RTO: 0 /100 WBCS (ref 0–0)
PLATELET # BLD AUTO: 343 X10*3/UL (ref 150–450)
POTASSIUM SERPL-SCNC: 3.9 MMOL/L (ref 3.5–5.3)
PROT SERPL-MCNC: 7.6 G/DL (ref 6.4–8.2)
RBC # BLD AUTO: 3.75 X10*6/UL (ref 4.5–5.9)
RH FACTOR (ANTIGEN D): NORMAL
SODIUM SERPL-SCNC: 141 MMOL/L (ref 136–145)
WBC # BLD AUTO: 11.3 X10*3/UL (ref 4.4–11.3)

## 2024-10-20 PROCEDURE — 2500000004 HC RX 250 GENERAL PHARMACY W/ HCPCS (ALT 636 FOR OP/ED)

## 2024-10-20 PROCEDURE — 2500000001 HC RX 250 WO HCPCS SELF ADMINISTERED DRUGS (ALT 637 FOR MEDICARE OP)

## 2024-10-20 PROCEDURE — 96365 THER/PROPH/DIAG IV INF INIT: CPT | Mod: 59

## 2024-10-20 PROCEDURE — 2500000004 HC RX 250 GENERAL PHARMACY W/ HCPCS (ALT 636 FOR OP/ED): Mod: JZ,JG

## 2024-10-20 PROCEDURE — 96367 TX/PROPH/DG ADDL SEQ IV INF: CPT

## 2024-10-20 PROCEDURE — 36415 COLL VENOUS BLD VENIPUNCTURE: CPT

## 2024-10-20 PROCEDURE — 85025 COMPLETE CBC W/AUTO DIFF WBC: CPT

## 2024-10-20 PROCEDURE — 99285 EMERGENCY DEPT VISIT HI MDM: CPT | Mod: 25

## 2024-10-20 PROCEDURE — 99285 EMERGENCY DEPT VISIT HI MDM: CPT

## 2024-10-20 PROCEDURE — 80053 COMPREHEN METABOLIC PANEL: CPT

## 2024-10-20 PROCEDURE — 86900 BLOOD TYPING SEROLOGIC ABO: CPT

## 2024-10-20 PROCEDURE — 96372 THER/PROPH/DIAG INJ SC/IM: CPT

## 2024-10-20 PROCEDURE — 96375 TX/PRO/DX INJ NEW DRUG ADDON: CPT

## 2024-10-20 PROCEDURE — G0378 HOSPITAL OBSERVATION PER HR: HCPCS

## 2024-10-20 RX ORDER — DEXAMETHASONE SODIUM PHOSPHATE 10 MG/ML
10 INJECTION INTRAMUSCULAR; INTRAVENOUS ONCE
Status: COMPLETED | OUTPATIENT
Start: 2024-10-20 | End: 2024-10-20

## 2024-10-20 RX ORDER — WATER
250 LIQUID (ML) MISCELLANEOUS
Status: DISCONTINUED | OUTPATIENT
Start: 2024-10-20 | End: 2024-10-22 | Stop reason: HOSPADM

## 2024-10-20 RX ORDER — ACETAMINOPHEN 160 MG/5ML
650 SOLUTION ORAL EVERY 6 HOURS
Status: DISCONTINUED | OUTPATIENT
Start: 2024-10-20 | End: 2024-10-22 | Stop reason: HOSPADM

## 2024-10-20 RX ORDER — ENOXAPARIN SODIUM 100 MG/ML
30 INJECTION SUBCUTANEOUS EVERY 12 HOURS
Status: DISCONTINUED | OUTPATIENT
Start: 2024-10-20 | End: 2024-10-20

## 2024-10-20 RX ORDER — METRONIDAZOLE 500 MG/100ML
500 INJECTION, SOLUTION INTRAVENOUS EVERY 8 HOURS
Status: DISCONTINUED | OUTPATIENT
Start: 2024-10-20 | End: 2024-10-22 | Stop reason: HOSPADM

## 2024-10-20 RX ORDER — ENOXAPARIN SODIUM 100 MG/ML
40 INJECTION SUBCUTANEOUS
Status: DISCONTINUED | OUTPATIENT
Start: 2024-10-20 | End: 2024-10-22 | Stop reason: HOSPADM

## 2024-10-20 RX ORDER — IBUPROFEN 600 MG/1
600 TABLET ORAL 3 TIMES DAILY
Status: DISCONTINUED | OUTPATIENT
Start: 2024-10-20 | End: 2024-10-22 | Stop reason: HOSPADM

## 2024-10-20 RX ORDER — AZITHROMYCIN 250 MG/1
250 TABLET, FILM COATED ORAL DAILY
COMMUNITY
End: 2024-10-22 | Stop reason: HOSPADM

## 2024-10-20 RX ORDER — ACETAMINOPHEN 325 MG/1
650 TABLET ORAL EVERY 6 HOURS
Status: DISCONTINUED | OUTPATIENT
Start: 2024-10-20 | End: 2024-10-22 | Stop reason: HOSPADM

## 2024-10-20 RX ORDER — DEXAMETHASONE SODIUM PHOSPHATE 10 MG/ML
8 INJECTION INTRAMUSCULAR; INTRAVENOUS EVERY 8 HOURS
Status: DISCONTINUED | OUTPATIENT
Start: 2024-10-20 | End: 2024-10-21

## 2024-10-20 RX ORDER — LEVOFLOXACIN 5 MG/ML
750 INJECTION, SOLUTION INTRAVENOUS EVERY 24 HOURS
Status: DISCONTINUED | OUTPATIENT
Start: 2024-10-20 | End: 2024-10-22 | Stop reason: HOSPADM

## 2024-10-20 RX ORDER — KETOROLAC TROMETHAMINE 15 MG/ML
15 INJECTION, SOLUTION INTRAMUSCULAR; INTRAVENOUS ONCE
Status: COMPLETED | OUTPATIENT
Start: 2024-10-20 | End: 2024-10-20

## 2024-10-20 RX ORDER — TRIPROLIDINE/PSEUDOEPHEDRINE 2.5MG-60MG
600 TABLET ORAL 3 TIMES DAILY
Status: DISCONTINUED | OUTPATIENT
Start: 2024-10-20 | End: 2024-10-22 | Stop reason: HOSPADM

## 2024-10-20 RX ADMIN — IBUPROFEN 600 MG: 600 TABLET, FILM COATED ORAL at 21:02

## 2024-10-20 RX ADMIN — DEXAMETHASONE SODIUM PHOSPHATE 10 MG: 10 INJECTION INTRAMUSCULAR; INTRAVENOUS at 14:04

## 2024-10-20 RX ADMIN — Medication 250 ML: at 20:00

## 2024-10-20 RX ADMIN — KETOROLAC TROMETHAMINE 15 MG: 15 INJECTION, SOLUTION INTRAMUSCULAR; INTRAVENOUS at 14:04

## 2024-10-20 RX ADMIN — DEXAMETHASONE SODIUM PHOSPHATE 8 MG: 10 INJECTION INTRAMUSCULAR; INTRAVENOUS at 21:02

## 2024-10-20 RX ADMIN — ACETAMINOPHEN 650 MG: 325 TABLET ORAL at 21:02

## 2024-10-20 RX ADMIN — METRONIDAZOLE 500 MG: 500 INJECTION, SOLUTION INTRAVENOUS at 23:44

## 2024-10-20 RX ADMIN — LEVOFLOXACIN 750 MG: 5 INJECTION, SOLUTION INTRAVENOUS at 16:11

## 2024-10-20 RX ADMIN — Medication 250 ML: at 21:08

## 2024-10-20 RX ADMIN — METRONIDAZOLE 500 MG: 500 INJECTION, SOLUTION INTRAVENOUS at 17:47

## 2024-10-20 RX ADMIN — ENOXAPARIN SODIUM 40 MG: 100 INJECTION SUBCUTANEOUS at 21:02

## 2024-10-20 SDOH — HEALTH STABILITY: MENTAL HEALTH: HOW OFTEN DO YOU HAVE A DRINK CONTAINING ALCOHOL?: MONTHLY OR LESS

## 2024-10-20 SDOH — SOCIAL STABILITY: SOCIAL INSECURITY: HAVE YOU HAD THOUGHTS OF HARMING ANYONE ELSE?: NO

## 2024-10-20 SDOH — SOCIAL STABILITY: SOCIAL INSECURITY: ARE THERE ANY APPARENT SIGNS OF INJURIES/BEHAVIORS THAT COULD BE RELATED TO ABUSE/NEGLECT?: NO

## 2024-10-20 SDOH — SOCIAL STABILITY: SOCIAL INSECURITY: WITHIN THE LAST YEAR, HAVE YOU BEEN HUMILIATED OR EMOTIONALLY ABUSED IN OTHER WAYS BY YOUR PARTNER OR EX-PARTNER?: NO

## 2024-10-20 SDOH — ECONOMIC STABILITY: HOUSING INSECURITY: AT ANY TIME IN THE PAST 12 MONTHS, WERE YOU HOMELESS OR LIVING IN A SHELTER (INCLUDING NOW)?: NO

## 2024-10-20 SDOH — ECONOMIC STABILITY: FOOD INSECURITY: WITHIN THE PAST 12 MONTHS, YOU WORRIED THAT YOUR FOOD WOULD RUN OUT BEFORE YOU GOT THE MONEY TO BUY MORE.: NEVER TRUE

## 2024-10-20 SDOH — SOCIAL STABILITY: SOCIAL INSECURITY
WITHIN THE LAST YEAR, HAVE YOU BEEN KICKED, HIT, SLAPPED, OR OTHERWISE PHYSICALLY HURT BY YOUR PARTNER OR EX-PARTNER?: NO

## 2024-10-20 SDOH — HEALTH STABILITY: MENTAL HEALTH: HOW OFTEN DO YOU HAVE SIX OR MORE DRINKS ON ONE OCCASION?: NEVER

## 2024-10-20 SDOH — SOCIAL STABILITY: SOCIAL INSECURITY
WITHIN THE LAST YEAR, HAVE YOU BEEN RAPED OR FORCED TO HAVE ANY KIND OF SEXUAL ACTIVITY BY YOUR PARTNER OR EX-PARTNER?: NO

## 2024-10-20 SDOH — SOCIAL STABILITY: SOCIAL INSECURITY: WERE YOU ABLE TO COMPLETE ALL THE BEHAVIORAL HEALTH SCREENINGS?: YES

## 2024-10-20 SDOH — SOCIAL STABILITY: SOCIAL INSECURITY: ARE YOU OR HAVE YOU BEEN THREATENED OR ABUSED PHYSICALLY, EMOTIONALLY, OR SEXUALLY BY ANYONE?: NO

## 2024-10-20 SDOH — ECONOMIC STABILITY: FOOD INSECURITY: HOW HARD IS IT FOR YOU TO PAY FOR THE VERY BASICS LIKE FOOD, HOUSING, MEDICAL CARE, AND HEATING?: NOT HARD AT ALL

## 2024-10-20 SDOH — ECONOMIC STABILITY: FOOD INSECURITY: WITHIN THE PAST 12 MONTHS, THE FOOD YOU BOUGHT JUST DIDN'T LAST AND YOU DIDN'T HAVE MONEY TO GET MORE.: NEVER TRUE

## 2024-10-20 SDOH — SOCIAL STABILITY: SOCIAL INSECURITY: DOES ANYONE TRY TO KEEP YOU FROM HAVING/CONTACTING OTHER FRIENDS OR DOING THINGS OUTSIDE YOUR HOME?: NO

## 2024-10-20 SDOH — SOCIAL STABILITY: SOCIAL INSECURITY: DO YOU FEEL ANYONE HAS EXPLOITED OR TAKEN ADVANTAGE OF YOU FINANCIALLY OR OF YOUR PERSONAL PROPERTY?: NO

## 2024-10-20 SDOH — ECONOMIC STABILITY: TRANSPORTATION INSECURITY: IN THE PAST 12 MONTHS, HAS LACK OF TRANSPORTATION KEPT YOU FROM MEDICAL APPOINTMENTS OR FROM GETTING MEDICATIONS?: NO

## 2024-10-20 SDOH — SOCIAL STABILITY: SOCIAL INSECURITY: HAVE YOU HAD ANY THOUGHTS OF HARMING ANYONE ELSE?: NO

## 2024-10-20 SDOH — ECONOMIC STABILITY: HOUSING INSECURITY: IN THE LAST 12 MONTHS, WAS THERE A TIME WHEN YOU WERE NOT ABLE TO PAY THE MORTGAGE OR RENT ON TIME?: NO

## 2024-10-20 SDOH — HEALTH STABILITY: MENTAL HEALTH: HOW MANY DRINKS CONTAINING ALCOHOL DO YOU HAVE ON A TYPICAL DAY WHEN YOU ARE DRINKING?: 1 OR 2

## 2024-10-20 SDOH — SOCIAL STABILITY: SOCIAL INSECURITY: WITHIN THE LAST YEAR, HAVE YOU BEEN AFRAID OF YOUR PARTNER OR EX-PARTNER?: NO

## 2024-10-20 SDOH — SOCIAL STABILITY: SOCIAL INSECURITY: ABUSE: ADULT

## 2024-10-20 SDOH — ECONOMIC STABILITY: INCOME INSECURITY: IN THE PAST 12 MONTHS HAS THE ELECTRIC, GAS, OIL, OR WATER COMPANY THREATENED TO SHUT OFF SERVICES IN YOUR HOME?: NO

## 2024-10-20 SDOH — ECONOMIC STABILITY: HOUSING INSECURITY: IN THE PAST 12 MONTHS, HOW MANY TIMES HAVE YOU MOVED WHERE YOU WERE LIVING?: 0

## 2024-10-20 SDOH — SOCIAL STABILITY: SOCIAL INSECURITY: HAS ANYONE EVER THREATENED TO HURT YOUR FAMILY OR YOUR PETS?: NO

## 2024-10-20 SDOH — SOCIAL STABILITY: SOCIAL INSECURITY: DO YOU FEEL UNSAFE GOING BACK TO THE PLACE WHERE YOU ARE LIVING?: NO

## 2024-10-20 ASSESSMENT — COGNITIVE AND FUNCTIONAL STATUS - GENERAL
PATIENT BASELINE BEDBOUND: NO
MOBILITY SCORE: 24
DAILY ACTIVITIY SCORE: 24
MOBILITY SCORE: 24
DAILY ACTIVITIY SCORE: 24

## 2024-10-20 ASSESSMENT — ACTIVITIES OF DAILY LIVING (ADL)
BATHING: INDEPENDENT
HEARING - RIGHT EAR: FUNCTIONAL
ASSISTIVE_DEVICE: DENTURES UPPER
JUDGMENT_ADEQUATE_SAFELY_COMPLETE_DAILY_ACTIVITIES: YES
WALKS IN HOME: INDEPENDENT
TOILETING: INDEPENDENT
LACK_OF_TRANSPORTATION: NO
PATIENT'S MEMORY ADEQUATE TO SAFELY COMPLETE DAILY ACTIVITIES?: YES
DRESSING YOURSELF: INDEPENDENT
ADEQUATE_TO_COMPLETE_ADL: YES
FEEDING YOURSELF: INDEPENDENT
GROOMING: INDEPENDENT
HEARING - LEFT EAR: FUNCTIONAL
LACK_OF_TRANSPORTATION: NO

## 2024-10-20 ASSESSMENT — ENCOUNTER SYMPTOMS
FEVER: 0
TROUBLE SWALLOWING: 1
SHORTNESS OF BREATH: 0
FACIAL SWELLING: 1
SORE THROAT: 0

## 2024-10-20 ASSESSMENT — COLUMBIA-SUICIDE SEVERITY RATING SCALE - C-SSRS
1. IN THE PAST MONTH, HAVE YOU WISHED YOU WERE DEAD OR WISHED YOU COULD GO TO SLEEP AND NOT WAKE UP?: NO
6. HAVE YOU EVER DONE ANYTHING, STARTED TO DO ANYTHING, OR PREPARED TO DO ANYTHING TO END YOUR LIFE?: NO
2. HAVE YOU ACTUALLY HAD ANY THOUGHTS OF KILLING YOURSELF?: NO

## 2024-10-20 ASSESSMENT — LIFESTYLE VARIABLES
HAVE PEOPLE ANNOYED YOU BY CRITICIZING YOUR DRINKING: NO
EVER FELT BAD OR GUILTY ABOUT YOUR DRINKING: NO
EVER HAD A DRINK FIRST THING IN THE MORNING TO STEADY YOUR NERVES TO GET RID OF A HANGOVER: NO
HAVE YOU EVER FELT YOU SHOULD CUT DOWN ON YOUR DRINKING: NO
AUDIT-C TOTAL SCORE: 1
SKIP TO QUESTIONS 9-10: 1
TOTAL SCORE: 0

## 2024-10-20 ASSESSMENT — PATIENT HEALTH QUESTIONNAIRE - PHQ9
SUM OF ALL RESPONSES TO PHQ9 QUESTIONS 1 & 2: 0
1. LITTLE INTEREST OR PLEASURE IN DOING THINGS: NOT AT ALL
2. FEELING DOWN, DEPRESSED OR HOPELESS: NOT AT ALL

## 2024-10-20 ASSESSMENT — PAIN SCALES - GENERAL
PAINLEVEL_OUTOF10: 2
PAINLEVEL_OUTOF10: 3

## 2024-10-20 ASSESSMENT — PAIN - FUNCTIONAL ASSESSMENT
PAIN_FUNCTIONAL_ASSESSMENT: 0-10
PAIN_FUNCTIONAL_ASSESSMENT: 0-10

## 2024-10-20 ASSESSMENT — PAIN DESCRIPTION - DESCRIPTORS: DESCRIPTORS: ACHING

## 2024-10-20 NOTE — CARE PLAN
Problem: Pain - Adult  Goal: Verbalizes/displays adequate comfort level or baseline comfort level  Outcome: Progressing     Problem: Safety - Adult  Goal: Free from fall injury  Outcome: Progressing     Problem: Discharge Planning  Goal: Discharge to home or other facility with appropriate resources  Outcome: Progressing     Problem: Chronic Conditions and Co-morbidities  Goal: Patient's chronic conditions and co-morbidity symptoms are monitored and maintained or improved  Outcome: Progressing     Problem: Respiratory  Goal: Minimal/no exertional discomfort or dyspnea this shift  Outcome: Progressing  Goal: No signs of respiratory distress (eg. Use of accessory muscles. Peds grunting)  Outcome: Progressing  Goal: Patent airway maintained this shift  Outcome: Progressing

## 2024-10-20 NOTE — ED TRIAGE NOTES
CURRENTLY BEING TREATED FOR AN ABSCESS NEAR THE LOWER RIGHT JAW WITH AZITHROMYCIN. STARTED YESTERDAY. PT REPORTS FOR CONTINUED PAIN AND SWELLING. AIRWAY PATENT, RESP EQUAL AND UNLABORED.

## 2024-10-20 NOTE — CONSULTS
Reason For Consult: Right facial Swelling     History Of Present Illness  Giovanni Lockhart Jr. is a 49 y.o. male presenting to ED for evaluation of right facial swelling and trismus. Patient has two week history of swelling that progressivly worsened. He visited [facility] ED 10/18 where he was given clindamycin. He visited general dentist 10/19 and was prescribed azithromycin. Patient reports no difficulty breathing but does endorse pain on swallowing. He noticed that the swelling has progressively gotten worse and today presents with trismus. His pain is a 7/10 on swallowing, Patient consumed cream and coffee around 11 AM      Past Medical History  He denies MH     Surgical History  History of lip repair when younger. Reports no problems with anesthetics.      Social History  Patient has 25 year smoking history. Patient smokes 2 cigars daily, social drinker (drinks/month), Marijuana daily, Denies illicit substances.     Allergies  Penicillins, Shellfish derived, Ampicillin, and Shellfish containing products    Review of Systems   Constitutional:  Negative for fever.   HENT:  Positive for facial swelling, mouth sores and trouble swallowing. Negative for sore throat.    Respiratory:  Negative for shortness of breath.         Physical Exam  HENT:      Head:      Comments: Patient has right sided facial swelling. Swelling is located at the angle of the mandible. Unable to fully palpate the angle of the right mandible. Patient has TTP at this location. No fistulas appreciated. CN5 intact and equal bilaterally. CN MM branch intact and equal bilaterally.      Mouth/Throat:      Mouth: Mucous membranes are moist.      Comments: #32 soft tissue erupted. Retained root #30. Pain to palpation lingual aspect #32 with fluid collection. Patient ANGELI 12mm with partial denture out of mouth with significant guarding. FOM soft. R FOM tender posterior tongue.   Eyes:      Conjunctiva/sclera: Conjunctivae normal.   Cardiovascular:       Comments: Patient warm and well perfused.   Pulmonary:      Effort: Pulmonary effort is normal.      Comments: Patient breathing comfortably on room air.   Skin:     General: Skin is warm.   Neurological:      Mental Status: He is alert and oriented to person, place, and time.          Last Recorded Vitals  Blood pressure 142/87, pulse 68, temperature 36.5 °C (97.7 °F), resp. rate 18, weight 74.8 kg (165 lb), SpO2 99%.    Lab Results   Component Value Date    WBC 11.3 10/20/2024    HGB 11.7 (L) 10/20/2024    HCT 36.7 (L) 10/20/2024    MCV 98 10/20/2024     10/20/2024      Relevant Results  Interpreted By:  Carrie Tello,   STUDY:  CT SOFT TISSUE NECK W IV CONTRAST;  10/18/2024 6:42 pm      INDICATION:  Signs/Symptoms:R sided neck/mouth pain/swelling, eval for abscess.      COMPARISON:  None.      ACCESSION NUMBER(S):  [id number]      ORDERING CLINICIAN:  ABRAM EATON      TECHNIQUE:  Axial CT images of the neck were obtained.  The patient received 75  mL Omnipaque 350 intravenous contrast agent. The images were  reformatted in angled axial, coronal and sagittal planes.      FINDINGS:  Oral Cavity, Pharynx and Larynx:  There is poor dentition with  numerous missing teeth. There are periapical lucencies and dental  caries associated with several of the residual teeth. There is  hypodensity with suspected subtle rim enhancement abutting and  partially encasing the posterior body and ramus of the right mandible  most pronounced along the lingual surface. The suspected collection  measures at least 3.8 cm in craniocaudal dimension by 1.9 cm in  transverse dimension. There is large dental caries as well as  periapical lucency associated with an incompletely erupted 3rd molar  with loss of cortex along the lingual surface of the mandible in this  region favored to represent periapical abscess formation. There is  soft tissue thickening and fat stranding within the right   and submandibular spaces.  There is thickening of the platysma muscle  with inflammatory changes both superficial and deep to the platysma.      The nasopharyngeal soft tissues are unremarkable. There are punctate  calcifications in the palatine tonsils which may be related to  previous infectious or inflammatory process. Lobulated soft tissue at  the base of the tongue bulging into the vallecula likely represents  lingual tonsil. The hypopharyngeal and laryngeal structures are  unremarkable.      Retropharyngeal and Prevertebral Soft Tissues: Unremarkable.      Lymph nodes: There are scattered cervical lymph nodes which are  nonspecific. There are prominent right submandibular lymph nodes  measuring up to 1.6 cm in greatest axial dimension. There are level 2  lymph nodes, right greater than left measuring up to at least 1.7 cm  in greatest axial dimension on the right. There are submental lymph  nodes measuring up to 1.2 cm.      Neck vessels: Bilateral neck vessels are normal in course and caliber  and appear patent.      Thyroid gland: The thyroid gland is enlarged.      Parotid and submandibular glands: Bilateral parotid and submandibular  glands are unremarkable in appearance.      Paranasal Sinuses and Mastoids: The right maxillary sinus is smaller  than the left which could reflect developmental variation. There is  minimal mucosal thickening within scattered paranasal sinuses. The  mastoid air cells are clear.      Visualized orbital structures are unremarkable.      Visualized upper lungs are clear.      There are multilevel degenerative changes of the cervical spine with  associated central canal and neuroforaminal stenosis.      IMPRESSION:  1. Findings thought to most likely represent large area of  phlegmonous change/developing abscess along the inner aspect of the  posterior body and ramus of the right mandible. There is prominent  dental caries with periapical lucency associated with the  incompletely erupted right 3rd  mandibular molar with loss of cortex  along the lingual surface of the mandible in this region. This is  favored to reflect periapical abscess formation.  2. There is poor dentition with numerous missing teeth. There is  prominent dental caries and periapical lucency associated with  numerous residual teeth.  3. Nonspecific cervical lymphadenopathy is favored to be reactive in  nature.  4. Thyromegaly. Correlation with laboratory values is recommended.     Assessment/Plan     Patient is a 50 y/o male who presents to ED with potential right pterygomandibular space infection due to odontogenic origin (exposed #32 and retained root #30) . Surgical intervention indicated plan for OR 10/20 for incision and drainage and potentially extraction of causative dentition. Patient not NPO due to coffee w creamer consumption 11 AM.      RECS:     - Keep NPO   - IV Levofloxacin and Flagyl   - IV decadron    [place] OMFS [place] [phone] with any issues/concerns.         If any issue with contacting via pager, please contact Deirdre Liang via Haiku.    2nd call to contact via Haiku is Damir Liang DMD

## 2024-10-20 NOTE — H&P
History Of Present Illness  Giovanni Lockhart Jr. is a 49 y.o. male presenting for evaluation of right facial swelling and trismus. Patient has two week history of swelling that progressivly worsened. He visited [facility] ED 10/18 where he was given clindamycin. He visited general dentist 10/19 and was prescribed azithromycin. Patient reports no difficulty breathing but does endorse pain on swallowing. He noticed that the swelling has progressively gotten worse and today presents with trismus. His pain is a 7/10 on swallowing.      Past Medical History  History reviewed. No pertinent past medical history.    Surgical History  Past Surgical History:   Procedure Laterality Date    LIP REPAIR      as a child   Patient reports no problems with anesthesia      Social History  Patient has 25 year smoking history. Patient smokes 2 cigars daily, social drinker (drinks/month), Marijuana daily, Denies illicit substances.       Allergies  Penicillins, Shellfish derived, Ampicillin, and Shellfish containing products    Review of Systems   Constitutional:  Negative for fever.   HENT:  Positive for dental problem, facial swelling, mouth sores and trouble swallowing.    Respiratory:  Negative for shortness of breath.         Physical Exam  HENT:      Head:      Comments: Patient has right sided facial swelling. Swelling is located at the angle of the mandible. Unable to fully palpate the angle of the right mandible. Patient has TTP at this location. No fistulas appreciated. CN5 intact and equal bilaterally. CN MM branch intact and equal bilaterally.     Mouth/Throat:      Mouth: Mucous membranes are moist.      Comments: #32 soft tissue erupted. Retained root #30. Pain to palpation lingual aspect #32 with fluid collection. Patient ANGELI 12mm with partial denture out of mouth with significant guarding. FOM soft. R FOM tender posterior tongue.   Eyes:      Conjunctiva/sclera: Conjunctivae normal.   Cardiovascular:      Comments: Patient warm  "and well perfused.   Pulmonary:      Comments: Patient breathing comfortably on room air.   Skin:     General: Skin is warm.   Neurological:      Mental Status: He is alert and oriented to person, place, and time.             Last Recorded Vitals  Blood pressure 153/87, pulse 66, temperature 36.7 °C (98.1 °F), temperature source Temporal, resp. rate 20, height 1.626 m (5' 4\"), weight 74.8 kg (165 lb), SpO2 95%.    Relevant Results    Lab Results   Component Value Date    WBC 11.3 10/20/2024    HGB 11.7 (L) 10/20/2024    HCT 36.7 (L) 10/20/2024    MCV 98 10/20/2024     10/20/2024        Interpreted By:  Carrie Tello,   STUDY:  CT SOFT TISSUE NECK W IV CONTRAST;  10/18/2024 6:42 pm      INDICATION:  Signs/Symptoms:R sided neck/mouth pain/swelling, eval for abscess.      COMPARISON:  None.      ACCESSION NUMBER(S):  [id number]      ORDERING CLINICIAN:  ABRAM EATON      TECHNIQUE:  Axial CT images of the neck were obtained.  The patient received 75  mL Omnipaque 350 intravenous contrast agent. The images were  reformatted in angled axial, coronal and sagittal planes.      FINDINGS:  Oral Cavity, Pharynx and Larynx:  There is poor dentition with  numerous missing teeth. There are periapical lucencies and dental  caries associated with several of the residual teeth. There is  hypodensity with suspected subtle rim enhancement abutting and  partially encasing the posterior body and ramus of the right mandible  most pronounced along the lingual surface. The suspected collection  measures at least 3.8 cm in craniocaudal dimension by 1.9 cm in  transverse dimension. There is large dental caries as well as  periapical lucency associated with an incompletely erupted 3rd molar  with loss of cortex along the lingual surface of the mandible in this  region favored to represent periapical abscess formation. There is  soft tissue thickening and fat stranding within the right   and submandibular spaces. There " is thickening of the platysma muscle  with inflammatory changes both superficial and deep to the platysma.      The nasopharyngeal soft tissues are unremarkable. There are punctate  calcifications in the palatine tonsils which may be related to  previous infectious or inflammatory process. Lobulated soft tissue at  the base of the tongue bulging into the vallecula likely represents  lingual tonsil. The hypopharyngeal and laryngeal structures are  unremarkable.      Retropharyngeal and Prevertebral Soft Tissues: Unremarkable.      Lymph nodes: There are scattered cervical lymph nodes which are  nonspecific. There are prominent right submandibular lymph nodes  measuring up to 1.6 cm in greatest axial dimension. There are level 2  lymph nodes, right greater than left measuring up to at least 1.7 cm  in greatest axial dimension on the right. There are submental lymph  nodes measuring up to 1.2 cm.      Neck vessels: Bilateral neck vessels are normal in course and caliber  and appear patent.      Thyroid gland: The thyroid gland is enlarged.      Parotid and submandibular glands: Bilateral parotid and submandibular  glands are unremarkable in appearance.      Paranasal Sinuses and Mastoids: The right maxillary sinus is smaller  than the left which could reflect developmental variation. There is  minimal mucosal thickening within scattered paranasal sinuses. The  mastoid air cells are clear.      Visualized orbital structures are unremarkable.      Visualized upper lungs are clear.      There are multilevel degenerative changes of the cervical spine with  associated central canal and neuroforaminal stenosis.      IMPRESSION:  1. Findings thought to most likely represent large area of  phlegmonous change/developing abscess along the inner aspect of the  posterior body and ramus of the right mandible. There is prominent  dental caries with periapical lucency associated with the  incompletely erupted right 3rd mandibular  molar with loss of cortex  along the lingual surface of the mandible in this region. This is  favored to reflect periapical abscess formation.  2. There is poor dentition with numerous missing teeth. There is  prominent dental caries and periapical lucency associated with  numerous residual teeth.  3. Nonspecific cervical lymphadenopathy is favored to be reactive in  nature.  4. Thyromegaly. Correlation with laboratory values is recommended.     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 Surgical intervention indicated. Plan is to admit patient with IV Flagyl and Levofloxacin, IV decadron and plan for OR 10/21 for incision and drainage and potentially extraction of causative dentition. Continuous monitored pulse ox and plan for NPO at midnight.       Plan:    Neuro:  - Tylenol 500 mg q6h Po PRN  - Ibuprofen 600 mg q6hr PO PRN    OMFS:  - IV Levofloxacin  750mg  - IV Metronidazole 500mg     GI:  - Regular diet until midnight   - Oral hydration until midnight   - NPO midnight 10/21/24 at 0001    Endo:  - Decadron 8mg q8h x3 doses    DVT ppx:  - Lovenox 40mg subcutaneous  - SCD’s  - Encourage OOB to chair and ambulation      [place] OMFS at [phone] with any issues/concerns.         If any issue with contacting via pager, please contact Deirdre Liang via Haiku.    2nd call to contact via Haiku is Damir Liang, MAE

## 2024-10-20 NOTE — NURSING NOTE
Pt declined four eyes assessment. Paged oral surg for admission orders. Oriented to  call system. Safety maintained. Will monitor.

## 2024-10-21 PROBLEM — R13.10 DYSPHAGIA: Status: ACTIVE | Noted: 2024-10-21

## 2024-10-21 LAB
ANION GAP SERPL CALC-SCNC: 13 MMOL/L (ref 10–20)
BUN SERPL-MCNC: 16 MG/DL (ref 6–23)
CALCIUM SERPL-MCNC: 9.6 MG/DL (ref 8.6–10.6)
CHLORIDE SERPL-SCNC: 103 MMOL/L (ref 98–107)
CO2 SERPL-SCNC: 24 MMOL/L (ref 21–32)
CREAT SERPL-MCNC: 1.07 MG/DL (ref 0.5–1.3)
EGFRCR SERPLBLD CKD-EPI 2021: 85 ML/MIN/1.73M*2
ERYTHROCYTE [DISTWIDTH] IN BLOOD BY AUTOMATED COUNT: 13.2 % (ref 11.5–14.5)
GLUCOSE SERPL-MCNC: 100 MG/DL (ref 74–99)
HCT VFR BLD AUTO: 35.1 % (ref 41–52)
HGB BLD-MCNC: 11.7 G/DL (ref 13.5–17.5)
MCH RBC QN AUTO: 31.5 PG (ref 26–34)
MCHC RBC AUTO-ENTMCNC: 33.3 G/DL (ref 32–36)
MCV RBC AUTO: 94 FL (ref 80–100)
NRBC BLD-RTO: 0 /100 WBCS (ref 0–0)
PLATELET # BLD AUTO: 355 X10*3/UL (ref 150–450)
POTASSIUM SERPL-SCNC: 4.1 MMOL/L (ref 3.5–5.3)
RBC # BLD AUTO: 3.72 X10*6/UL (ref 4.5–5.9)
SODIUM SERPL-SCNC: 136 MMOL/L (ref 136–145)
WBC # BLD AUTO: 11.1 X10*3/UL (ref 4.4–11.3)

## 2024-10-21 PROCEDURE — 90656 IIV3 VACC NO PRSV 0.5 ML IM: CPT | Performed by: EMERGENCY MEDICINE

## 2024-10-21 PROCEDURE — 3600000007 HC OR TIME - EACH INCREMENTAL 1 MINUTE - PROCEDURE LEVEL TWO: Performed by: DENTIST

## 2024-10-21 PROCEDURE — 0K920ZZ DRAINAGE OF RIGHT NECK MUSCLE, OPEN APPROACH: ICD-10-PCS | Performed by: DENTIST

## 2024-10-21 PROCEDURE — 7100000002 HC RECOVERY ROOM TIME - EACH INCREMENTAL 1 MINUTE: Performed by: DENTIST

## 2024-10-21 PROCEDURE — C1729 CATH, DRAINAGE: HCPCS | Performed by: DENTIST

## 2024-10-21 PROCEDURE — 2500000004 HC RX 250 GENERAL PHARMACY W/ HCPCS (ALT 636 FOR OP/ED): Performed by: DENTIST

## 2024-10-21 PROCEDURE — 87075 CULTR BACTERIA EXCEPT BLOOD: CPT | Performed by: EMERGENCY MEDICINE

## 2024-10-21 PROCEDURE — 36415 COLL VENOUS BLD VENIPUNCTURE: CPT

## 2024-10-21 PROCEDURE — A10061 PR DRAIN SKIN ABSCESS COMPLIC: Performed by: ANESTHESIOLOGY

## 2024-10-21 PROCEDURE — 90471 IMMUNIZATION ADMIN: CPT | Performed by: EMERGENCY MEDICINE

## 2024-10-21 PROCEDURE — 2500000004 HC RX 250 GENERAL PHARMACY W/ HCPCS (ALT 636 FOR OP/ED): Mod: JZ

## 2024-10-21 PROCEDURE — 3700000002 HC GENERAL ANESTHESIA TIME - EACH INCREMENTAL 1 MINUTE: Performed by: DENTIST

## 2024-10-21 PROCEDURE — 3600000002 HC OR TIME - INITIAL BASE CHARGE - PROCEDURE LEVEL TWO: Performed by: DENTIST

## 2024-10-21 PROCEDURE — 3700000001 HC GENERAL ANESTHESIA TIME - INITIAL BASE CHARGE: Performed by: DENTIST

## 2024-10-21 PROCEDURE — 2500000004 HC RX 250 GENERAL PHARMACY W/ HCPCS (ALT 636 FOR OP/ED): Mod: JZ,JG

## 2024-10-21 PROCEDURE — 2500000004 HC RX 250 GENERAL PHARMACY W/ HCPCS (ALT 636 FOR OP/ED)

## 2024-10-21 PROCEDURE — 80048 BASIC METABOLIC PNL TOTAL CA: CPT

## 2024-10-21 PROCEDURE — 2500000005 HC RX 250 GENERAL PHARMACY W/O HCPCS: Performed by: DENTIST

## 2024-10-21 PROCEDURE — 2720000007 HC OR 272 NO HCPCS: Performed by: DENTIST

## 2024-10-21 PROCEDURE — 2500000004 HC RX 250 GENERAL PHARMACY W/ HCPCS (ALT 636 FOR OP/ED): Performed by: EMERGENCY MEDICINE

## 2024-10-21 PROCEDURE — 0CTX0Z1 RESECTION OF LOWER TOOTH, MULTIPLE, OPEN APPROACH: ICD-10-PCS | Performed by: DENTIST

## 2024-10-21 PROCEDURE — 1100000001 HC PRIVATE ROOM DAILY

## 2024-10-21 PROCEDURE — 87077 CULTURE AEROBIC IDENTIFY: CPT | Performed by: EMERGENCY MEDICINE

## 2024-10-21 PROCEDURE — 2500000001 HC RX 250 WO HCPCS SELF ADMINISTERED DRUGS (ALT 637 FOR MEDICARE OP)

## 2024-10-21 PROCEDURE — 7100000001 HC RECOVERY ROOM TIME - INITIAL BASE CHARGE: Performed by: DENTIST

## 2024-10-21 PROCEDURE — 85027 COMPLETE CBC AUTOMATED: CPT

## 2024-10-21 RX ORDER — LIDOCAINE HYDROCHLORIDE AND EPINEPHRINE 10; 10 MG/ML; UG/ML
INJECTION, SOLUTION INFILTRATION; PERINEURAL AS NEEDED
Status: DISCONTINUED | OUTPATIENT
Start: 2024-10-21 | End: 2024-10-21 | Stop reason: HOSPADM

## 2024-10-21 RX ORDER — LIDOCAINE HYDROCHLORIDE 10 MG/ML
0.1 INJECTION, SOLUTION INFILTRATION; PERINEURAL ONCE
Status: DISCONTINUED | OUTPATIENT
Start: 2024-10-21 | End: 2024-10-21 | Stop reason: HOSPADM

## 2024-10-21 RX ORDER — SODIUM CHLORIDE 0.9 G/100ML
IRRIGANT IRRIGATION AS NEEDED
Status: DISCONTINUED | OUTPATIENT
Start: 2024-10-21 | End: 2024-10-21 | Stop reason: HOSPADM

## 2024-10-21 RX ORDER — LIDOCAINE HYDROCHLORIDE 20 MG/ML
INJECTION, SOLUTION INFILTRATION; PERINEURAL AS NEEDED
Status: DISCONTINUED | OUTPATIENT
Start: 2024-10-21 | End: 2024-10-21

## 2024-10-21 RX ORDER — SODIUM CHLORIDE, SODIUM LACTATE, POTASSIUM CHLORIDE, CALCIUM CHLORIDE 600; 310; 30; 20 MG/100ML; MG/100ML; MG/100ML; MG/100ML
INJECTION, SOLUTION INTRAVENOUS CONTINUOUS PRN
Status: DISCONTINUED | OUTPATIENT
Start: 2024-10-21 | End: 2024-10-21

## 2024-10-21 RX ORDER — MIDAZOLAM HYDROCHLORIDE 1 MG/ML
INJECTION INTRAMUSCULAR; INTRAVENOUS AS NEEDED
Status: DISCONTINUED | OUTPATIENT
Start: 2024-10-21 | End: 2024-10-21

## 2024-10-21 RX ORDER — MEPERIDINE HYDROCHLORIDE 25 MG/ML
12.5 INJECTION INTRAMUSCULAR; INTRAVENOUS; SUBCUTANEOUS EVERY 10 MIN PRN
Status: DISCONTINUED | OUTPATIENT
Start: 2024-10-21 | End: 2024-10-21 | Stop reason: HOSPADM

## 2024-10-21 RX ORDER — PROPOFOL 10 MG/ML
INJECTION, EMULSION INTRAVENOUS AS NEEDED
Status: DISCONTINUED | OUTPATIENT
Start: 2024-10-21 | End: 2024-10-21

## 2024-10-21 RX ORDER — LABETALOL HYDROCHLORIDE 5 MG/ML
5 INJECTION, SOLUTION INTRAVENOUS ONCE AS NEEDED
Status: DISCONTINUED | OUTPATIENT
Start: 2024-10-21 | End: 2024-10-21 | Stop reason: HOSPADM

## 2024-10-21 RX ORDER — HYDROMORPHONE HYDROCHLORIDE 1 MG/ML
0.5 INJECTION, SOLUTION INTRAMUSCULAR; INTRAVENOUS; SUBCUTANEOUS EVERY 5 MIN PRN
Status: DISCONTINUED | OUTPATIENT
Start: 2024-10-21 | End: 2024-10-21 | Stop reason: HOSPADM

## 2024-10-21 RX ORDER — ONDANSETRON HYDROCHLORIDE 2 MG/ML
INJECTION, SOLUTION INTRAVENOUS AS NEEDED
Status: DISCONTINUED | OUTPATIENT
Start: 2024-10-21 | End: 2024-10-21

## 2024-10-21 RX ORDER — SODIUM CHLORIDE, SODIUM LACTATE, POTASSIUM CHLORIDE, CALCIUM CHLORIDE 600; 310; 30; 20 MG/100ML; MG/100ML; MG/100ML; MG/100ML
100 INJECTION, SOLUTION INTRAVENOUS CONTINUOUS
Status: DISCONTINUED | OUTPATIENT
Start: 2024-10-21 | End: 2024-10-21

## 2024-10-21 RX ORDER — ONDANSETRON HYDROCHLORIDE 2 MG/ML
4 INJECTION, SOLUTION INTRAVENOUS ONCE AS NEEDED
Status: DISCONTINUED | OUTPATIENT
Start: 2024-10-21 | End: 2024-10-21 | Stop reason: HOSPADM

## 2024-10-21 RX ORDER — FENTANYL CITRATE 50 UG/ML
INJECTION, SOLUTION INTRAMUSCULAR; INTRAVENOUS AS NEEDED
Status: DISCONTINUED | OUTPATIENT
Start: 2024-10-21 | End: 2024-10-21

## 2024-10-21 RX ORDER — HYDROMORPHONE HYDROCHLORIDE 1 MG/ML
0.1 INJECTION, SOLUTION INTRAMUSCULAR; INTRAVENOUS; SUBCUTANEOUS EVERY 5 MIN PRN
Status: DISCONTINUED | OUTPATIENT
Start: 2024-10-21 | End: 2024-10-21 | Stop reason: HOSPADM

## 2024-10-21 RX ORDER — HYDROMORPHONE HYDROCHLORIDE 1 MG/ML
0.2 INJECTION, SOLUTION INTRAMUSCULAR; INTRAVENOUS; SUBCUTANEOUS EVERY 5 MIN PRN
Status: DISCONTINUED | OUTPATIENT
Start: 2024-10-21 | End: 2024-10-21 | Stop reason: HOSPADM

## 2024-10-21 RX ORDER — ESMOLOL HYDROCHLORIDE 10 MG/ML
INJECTION INTRAVENOUS AS NEEDED
Status: DISCONTINUED | OUTPATIENT
Start: 2024-10-21 | End: 2024-10-21

## 2024-10-21 RX ORDER — ROCURONIUM BROMIDE 10 MG/ML
INJECTION, SOLUTION INTRAVENOUS AS NEEDED
Status: DISCONTINUED | OUTPATIENT
Start: 2024-10-21 | End: 2024-10-21

## 2024-10-21 RX ADMIN — DEXAMETHASONE SODIUM PHOSPHATE 8 MG: 10 INJECTION INTRAMUSCULAR; INTRAVENOUS at 14:49

## 2024-10-21 RX ADMIN — INFLUENZA VIRUS VACCINE 0.5 ML: 15; 15; 15 SUSPENSION INTRAMUSCULAR at 08:42

## 2024-10-21 RX ADMIN — DEXAMETHASONE SODIUM PHOSPHATE 8 MG: 10 INJECTION INTRAMUSCULAR; INTRAVENOUS at 06:09

## 2024-10-21 RX ADMIN — SODIUM CHLORIDE, POTASSIUM CHLORIDE, SODIUM LACTATE AND CALCIUM CHLORIDE 100 ML/HR: 600; 310; 30; 20 INJECTION, SOLUTION INTRAVENOUS at 20:54

## 2024-10-21 RX ADMIN — IBUPROFEN 600 MG: 600 TABLET, FILM COATED ORAL at 14:49

## 2024-10-21 RX ADMIN — METRONIDAZOLE 500 MG: 500 INJECTION, SOLUTION INTRAVENOUS at 13:47

## 2024-10-21 RX ADMIN — LEVOFLOXACIN 750 MG: 5 INJECTION, SOLUTION INTRAVENOUS at 15:58

## 2024-10-21 RX ADMIN — IBUPROFEN 600 MG: 600 TABLET, FILM COATED ORAL at 08:31

## 2024-10-21 RX ADMIN — METRONIDAZOLE 500 MG: 500 INJECTION, SOLUTION INTRAVENOUS at 23:25

## 2024-10-21 RX ADMIN — ACETAMINOPHEN 650 MG: 325 TABLET ORAL at 02:08

## 2024-10-21 RX ADMIN — ACETAMINOPHEN 650 MG: 325 TABLET ORAL at 08:31

## 2024-10-21 RX ADMIN — ACETAMINOPHEN 650 MG: 325 TABLET ORAL at 14:49

## 2024-10-21 SDOH — HEALTH STABILITY: MENTAL HEALTH: CURRENT SMOKER: 0

## 2024-10-21 ASSESSMENT — COGNITIVE AND FUNCTIONAL STATUS - GENERAL
DAILY ACTIVITIY SCORE: 24
MOBILITY SCORE: 24

## 2024-10-21 ASSESSMENT — PAIN - FUNCTIONAL ASSESSMENT
PAIN_FUNCTIONAL_ASSESSMENT: 0-10

## 2024-10-21 ASSESSMENT — PAIN SCALES - GENERAL
PAINLEVEL_OUTOF10: 4
PAINLEVEL_OUTOF10: 1
PAINLEVEL_OUTOF10: 1
PAINLEVEL_OUTOF10: 2
PAIN_LEVEL: 0
PAINLEVEL_OUTOF10: 2
PAINLEVEL_OUTOF10: 1

## 2024-10-21 ASSESSMENT — PAIN DESCRIPTION - LOCATION: LOCATION: JAW

## 2024-10-21 NOTE — NURSING NOTE
RN notified oral surgery team regarding pt abscesses leaking green fluid. No interventions at this time.

## 2024-10-21 NOTE — CARE PLAN
The patient's goals for the shift include to get this abscess out and drained    The clinical goals for the shift include patient comfort and oxygen saturation above 92%      Problem: Pain - Adult  Goal: Verbalizes/displays adequate comfort level or baseline comfort level  Outcome: Progressing     Problem: Safety - Adult  Goal: Free from fall injury  Outcome: Progressing     Problem: Discharge Planning  Goal: Discharge to home or other facility with appropriate resources  Outcome: Progressing     Problem: Chronic Conditions and Co-morbidities  Goal: Patient's chronic conditions and co-morbidity symptoms are monitored and maintained or improved  Outcome: Progressing     Problem: Respiratory  Goal: Minimal/no exertional discomfort or dyspnea this shift  Outcome: Progressing  Goal: No signs of respiratory distress (eg. Use of accessory muscles. Peds grunting)  Outcome: Progressing  Goal: Patent airway maintained this shift  Outcome: Progressing

## 2024-10-21 NOTE — PROGRESS NOTES
10/21/24 1229   Discharge Planning   Living Arrangements Spouse/significant other   Support Systems Spouse/significant other   Assistance Needed None   Type of Residence Private residence   Do you have animals or pets at home? No   Home or Post Acute Services None   Expected Discharge Disposition Home   Does the patient need discharge transport arranged? No   Financial Resource Strain   How hard is it for you to pay for the very basics like food, housing, medical care, and heating? Not very   Housing Stability   In the last 12 months, was there a time when you were not able to pay the mortgage or rent on time? N   Transportation Needs   In the past 12 months, has lack of transportation kept you from medical appointments or from getting medications? no       DC Planning:  10/21:   Went in and met with the pt, confirmed demographics.     Transitional Care Coordination Progress Note:  Patient discussed during interdisciplinary rounds.     Plan per Medical/Surgical team:   Home. No home going needs anticipated for this pt at this time.      Payer: [employer]    Status: INP    Discharge disposition: Home. No home going needs anticipated for this pt at this time.      Potential Barriers: None    ADOD:  10/23      This TCC will continue to follow for home going needs and safe DC plan.    Lupe Causey. SHARA. TCC.

## 2024-10-21 NOTE — ANESTHESIA PREPROCEDURE EVALUATION
Patient: Giovanni Lockhart Jr.    Procedure Information       Anesthesia Start Date/Time: 10/21/24 1932    Procedure: Incision and Drainage Neck (Right: Face)    Location: [facility] OR 06 / [facility] [place]    Surgeons: Wally Goodwin MD, DDS            Relevant Problems   GI   (+) Dysphagia      Hematology   (+) Anemia      ID  Right-sided neck abscess       Clinical information reviewed:   Tobacco  Allergies  Meds   Med Hx  Surg Hx   Fam Hx  Soc Hx        NPO Detail:  No data recorded     Physical Exam    Airway  Mallampati: II  TM distance: >3 FB  Neck ROM: full     Cardiovascular   Rhythm: regular  Rate: normal     Dental        Pulmonary   Breath sounds clear to auscultation     Abdominal            Anesthesia Plan    History of general anesthesia?: yes  History of complications of general anesthesia?: no    ASA 3     general     The patient is not a current smoker.  Patient did not smoke on day of procedure.    intravenous induction   Postoperative administration of opioids is intended.  Trial extubation is planned.  Anesthetic plan and risks discussed with patient.  Use of blood products discussed with patient who consented to blood products.    Plan discussed with resident.

## 2024-10-22 ENCOUNTER — PHARMACY VISIT (OUTPATIENT)
Dept: PHARMACY | Facility: CLINIC | Age: 49
End: 2024-10-22
Payer: MEDICARE

## 2024-10-22 VITALS
BODY MASS INDEX: 28.17 KG/M2 | WEIGHT: 165 LBS | HEART RATE: 76 BPM | DIASTOLIC BLOOD PRESSURE: 87 MMHG | HEIGHT: 64 IN | RESPIRATION RATE: 16 BRPM | SYSTOLIC BLOOD PRESSURE: 144 MMHG | TEMPERATURE: 98.2 F

## 2024-10-22 PROCEDURE — 2500000004 HC RX 250 GENERAL PHARMACY W/ HCPCS (ALT 636 FOR OP/ED): Mod: JZ

## 2024-10-22 PROCEDURE — 2500000001 HC RX 250 WO HCPCS SELF ADMINISTERED DRUGS (ALT 637 FOR MEDICARE OP)

## 2024-10-22 PROCEDURE — RXMED WILLOW AMBULATORY MEDICATION CHARGE

## 2024-10-22 RX ORDER — CHLORHEXIDINE GLUCONATE ORAL RINSE 1.2 MG/ML
15 SOLUTION DENTAL AS NEEDED
Qty: 473 ML | Refills: 0 | Status: SHIPPED | OUTPATIENT
Start: 2024-10-22

## 2024-10-22 RX ORDER — ACETAMINOPHEN 325 MG/1
650 TABLET ORAL EVERY 6 HOURS
Qty: 40 TABLET | Refills: 0 | Status: SHIPPED | OUTPATIENT
Start: 2024-10-22

## 2024-10-22 RX ORDER — BACITRACIN ZINC 500 UNIT/G
OINTMENT (GRAM) TOPICAL 2 TIMES DAILY
Qty: 28.4 G | Refills: 0 | Status: SHIPPED | OUTPATIENT
Start: 2024-10-22

## 2024-10-22 RX ORDER — LEVOFLOXACIN 250 MG/1
750 TABLET ORAL DAILY
Qty: 21 TABLET | Refills: 0 | Status: SHIPPED | OUTPATIENT
Start: 2024-10-22 | End: 2024-10-29

## 2024-10-22 RX ORDER — OXYCODONE HYDROCHLORIDE 5 MG/1
5 TABLET ORAL EVERY 6 HOURS PRN
Qty: 12 TABLET | Refills: 0 | Status: SHIPPED | OUTPATIENT
Start: 2024-10-22 | End: 2024-10-25

## 2024-10-22 RX ORDER — IBUPROFEN 600 MG/1
600 TABLET ORAL EVERY 6 HOURS
Qty: 40 TABLET | Refills: 0 | Status: SHIPPED | OUTPATIENT
Start: 2024-10-22

## 2024-10-22 RX ORDER — METRONIDAZOLE 500 MG/1
500 TABLET ORAL 3 TIMES DAILY
Qty: 21 TABLET | Refills: 0 | Status: SHIPPED | OUTPATIENT
Start: 2024-10-22 | End: 2024-10-29

## 2024-10-22 RX ADMIN — IBUPROFEN 600 MG: 600 TABLET, FILM COATED ORAL at 08:23

## 2024-10-22 RX ADMIN — Medication 250 ML: at 06:18

## 2024-10-22 RX ADMIN — Medication 250 ML: at 11:11

## 2024-10-22 RX ADMIN — Medication 250 ML: at 11:14

## 2024-10-22 RX ADMIN — ACETAMINOPHEN 650 MG: 325 TABLET ORAL at 08:23

## 2024-10-22 RX ADMIN — Medication 250 ML: at 09:07

## 2024-10-22 RX ADMIN — METRONIDAZOLE 500 MG: 500 INJECTION, SOLUTION INTRAVENOUS at 08:23

## 2024-10-22 RX ADMIN — ACETAMINOPHEN 650 MG: 325 TABLET ORAL at 01:58

## 2024-10-22 RX ADMIN — Medication 250 ML: at 08:23

## 2024-10-22 ASSESSMENT — COGNITIVE AND FUNCTIONAL STATUS - GENERAL
DAILY ACTIVITIY SCORE: 24
DAILY ACTIVITIY SCORE: 24
MOBILITY SCORE: 24
MOBILITY SCORE: 24

## 2024-10-22 ASSESSMENT — PAIN SCALES - GENERAL: PAINLEVEL_OUTOF10: 1

## 2024-10-22 NOTE — DISCHARGE INSTRUCTIONS
DEPARTMENT OF ORAL & MAXILLOFACIAL SURGERY  DISCHARGE INSTRUCTIONS    C O N F I D E N T I A L   I N F O R M A T I O N  -------------------------------------------------------------------------------------------------------------------    Giovanni Richy Miranda  [id number]    The following is a brief overview of your hospitalization. Some of the information contained on this summary may be confidential.  This information should be kept in your records and should be shared with your regular doctor.    Admission Date:10/20/2024  1:29 PM  Discharge Date: No discharge date for patient encounter.    Disposition:  Home    PRINCIPAL DIAGNOSIS (reason after study for this admission): Right mandibular/neck swelling    Physicians:   Dr. Goodwin    Operations performed while hospitalized:   Right neck incision and drainage, extraction of #31 and #32        Treatment/wound care:   Keep area(s) clean and dry.   It is okay to shower 48 hours after time of surgery.    Do not scrub wound(s), pat dry.    Do not submerge wound(s) in standing water until seen in followup (no tub bathing, swimming, or hot tubs).    Please visually inspect your wound(s) at least once daily.  If the wound(s) are in a difficult to see location, please use a mirror or have someone else assist with visual inspection.    If you have sutures that you can see outside of the skin or staples:  Please have staples/sutures removed in our [id number] days after the date of surgery.  Do not remove the staples/sutures on your own.  Return sooner or call if wound(s) or surrounding area have increased swelling, pain, warmth, redness, or drainage that is thick, yellow and/or green.    Pending results:   Surgical Culture    Pain Control:    Adequate management:   Oxycodone can be taken as prescribed as needed for breakthrough pain.    Oxycodone is a narcotic, which can induce constipation.   Please take stool softeners when taking this medication to prevent  constipation.    Activity after Discharge:   No heavy lifting, weight bearing as tolerated, no driving until mobility is returned to normal.   Do not submerge wound(s) in standing water for 7 days after surgery (no tub bathing, swimming, or hot tubs).   Do not drive or operate heavy machinery while taking narcotic pain medications as these medications can alter perception, impair judgement, and slow reaction times.    Diet: Soft diet until extraction site has healed    Additional Instructions:   Follow up will be scheduled at our outpatient clinic on 10/29/24 in the AM at our outpatient clinic located at   Department of Oral & Maxillofacial Surgery  [address] [place] [address], 1st Floor ([facility] [employer] [facility])  [place] [address]    Office phone number: [phone].  Office fax number: [phone].  Team Pager: [phone].  Patients can contact the [email or web address] through the hospital  [phone].

## 2024-10-22 NOTE — DISCHARGE SUMMARY
Discharge Diagnosis  Abscess of right neck/mandible    Issues Requiring Follow-Up  Post surgical follow up    Test Results Pending At Discharge  Pending Labs       Order Current Status    Fungal Culture/Smear In process    Tissue/Wound Culture/Smear Preliminary result            Hospital Course   Patient arrived to ED on 10/20 with right neck/mandibular swelling. Patient had a 2 week history of swelling that progressively worsened. OMFS was consulted and thorough exam was performed. Patient was admitted to OMFS service on LT 9 for plan to go to OR for drainage of right neck/mandibular abscess. Patient received IV antibiotics and medications overnight with no acute events overnight. On HOD 2, patient was taken to OR for drainage of right neck/mandibular abscess. Patient tolerated the procedure well and was taken to PACU for uneventful recovery. Patient returned back to the floor on LT 9 and recovered uneventfully overnight. Patient was discharged on HOD 3.     Pertinent Physical Exam At Time of Discharge  Physical Exam  Constitutional: AAOX3, resting comfortably in bed  NEURO: Alert and oriented x3, no gross motor or sensory deficits.  PULM: Breathing comfortably on RA  GI: Abd soft, nontender, nondistended,  Skin: Warm and dry. No rashes or lesions noted.  Eyes: PERRL, EOMI. clear sclera  ENMT: MMM, CN V and VII intact b/l, right sided facial edema centered at right angle of mandible which is TTP,  minimal strikethrough on wound dressing, right inferior border and angle of mandible palpable, well healed extraction sites, extraoral incision c/d/I.   Extremities: ANDREWS  PSYCH: Appropriate mood and behavior     Home Medications     Medication List      START taking these medications     bacitracin 500 unit/gram ointment; Apply topically 2 times a day.   chlorhexidine 0.12 % solution; Commonly known as: Peridex; Use 15 mL in   the mouth or throat if needed for wound care.   levoFLOXacin 250 mg tablet; Commonly known as:  Levaquin; Take 3 tablets   (750 mg) by mouth once daily for 7 days.   metroNIDAZOLE 500 mg tablet; Commonly known as: Flagyl; Take 1 tablet   (500 mg) by mouth 3 times a day for 7 days.   oxyCODONE 5 mg immediate release tablet; Commonly known as: Roxicodone;   Take 1 tablet (5 mg) by mouth every 6 hours if needed for severe pain (7 -   10) for up to 3 days.     CHANGE how you take these medications     acetaminophen 325 mg tablet; Commonly known as: Tylenol; Take 2 tablets   (650 mg) by mouth every 6 hours.; What changed: when to take this, reasons   to take this   ibuprofen 600 mg tablet; Take 1 tablet (600 mg) by mouth every 6 hours.;   What changed: when to take this, reasons to take this     STOP taking these medications     azithromycin 250 mg tablet; Commonly known as: Zithromax   cefdinir 300 mg capsule; Commonly known as: Omnicef   clindamycin 150 mg capsule; Commonly known as: Cleocin   naproxen 500 mg tablet; Commonly known as: Naprosyn       Outpatient Follow-Up  [place] [facility] [place] 10[place]29/24 in the AM located at Department of Oral & Maxillofacial Surgery  [address] [place] [address], 1st Floor ([facility])  [place] [address]    Office phone number: [phone].  Office fax number: [phone].  Team Pager: [phone].  Patients can contact the [email or web address] through the hospital  [phone].     Jake Mondragon, MAE

## 2024-10-22 NOTE — PROGRESS NOTES
"  Subjective   Giovanni Lockhart Jr. is a 49 y.o. male on HOD 2 POD 1 s/p incision and drainage of right neck with extraction of teeth #31,32. Patient has been ambulating, urinating, and tolerating PO fluids. Endorses flatus but denies bowel movement. Patient reports well controlled post-operative pain and rates it at 2/10.  Patient denies any difficulty breathing, swallowing, or tolerating his own secretions.        Objective     Constitutional: AAOX3, resting comfortably in bed  NEURO: Alert and oriented x3, no gross motor or sensory deficits.  CV: RRR  PULM: Breathing comfortably on RA  GI: Abd soft, nontender, nondistended,  Skin: Warm and dry. No rashes or lesions noted.  Eyes: PERRL, EOMI. clear sclera  ENMT: MMM, CN V and VII intact b/l, right sided facial edema centered at right angle of mandible which is TTP,  minimal strikethrough on wound dressing, right inferior border and angle of mandible palpable, well healed extraction sites, extraoral incision c/d/I.   Extremities: ANDREWS  PSYCH: Appropriate mood and behavior     Last Recorded Vitals  Blood pressure 138/81, pulse 61, temperature 36.1 °C (97 °F), temperature source Temporal, resp. rate 14, height 1.626 m (5' 4\"), weight 74.8 kg (165 lb), SpO2 95%.  Intake/Output last 3 Shifts:  I/O last 3 completed shifts:  In: 700 (9.4 mL/kg) [P.O.:500; IV Piggyback:200]  Out: 0 (0 mL/kg)   Weight: 74.8 kg                         Assessment/Plan   Assessment & Plan  Abscess of neck    Neck abscess    Dysphagia    Giovanni Lockhart Jr. is a 49 y.o. male on HOD 2 POD 1 s/p incision and drainage of right neck with extraction of teeth #31,32. Patient progressing well towards discharge in post-operative period. Plan is for contiinous monitoring, antibiotics, and potential discharge this afternoon (10/22).       Plan:       Neuro:  - Tylenol 500 mg q6h PO PRN  - Ibuprofen 600 mg q6hr PO PRN     OMFS:  - IV Levofloxacin  750mg  - IV Metronidazole 500mg      GI:  - Soft diet   "   Endo:  - Decadron 8mg q8h x3 doses     DVT ppx:  - Lovenox 40mg subcutaneous  - SCD’s  - Encourage OOB to chair and ambulation        Please page OMFS at [phone] with any issues/concerns. If any issue with contacting via pager, please contact Christiano Panda via [email or web address].      2nd call to contact via Haiku is Anuel Sweet   3rd call via Haiku is Damir Panda, MAE

## 2024-10-22 NOTE — NURSING NOTE
Patient returned to unit, on room air no sign or symptoms of discomfort.  Patient is rating pain at this time 2/10.  No other questions or concerns at this time call-light and personal items within reach.

## 2024-10-22 NOTE — ANESTHESIA POSTPROCEDURE EVALUATION
Patient: Giovanni Lockhart Jr.    Procedure Summary       Date: 10/21/24 Room / Location: [facility] OR 06 / [facility] OR    Anesthesia Start: 1932 Anesthesia Stop: 2059    Procedure: INCISION AND DRAINAGE RIGHT NECK AND EXTRACTION OF TWO TEETH (Right: Face) Diagnosis:       Abscess of neck      (Abscess of neck [L02.11])    Surgeons: Wally Goodwin MD, DDS Responsible Provider: Megan Johnson MD    Anesthesia Type: general ASA Status: 3            Anesthesia Type: general    Vitals Value Taken Time   /87 10/21/24 2102   Temp 36.4 °C (97.5 °F) 10/21/24 2054   Pulse 94 10/21/24 2059   Resp 15 10/21/24 2059   SpO2 100 % 10/21/24 2059   Vitals shown include unfiled device data.    Anesthesia Post Evaluation    Patient location during evaluation: PACU  Patient participation: complete - patient participated  Level of consciousness: sleepy but conscious  Pain score: 0  Pain management: satisfactory to patient  Airway patency: patent  Cardiovascular status: acceptable  Respiratory status: acceptable  Hydration status: acceptable  Postoperative Nausea and Vomiting: none        No notable events documented.

## 2024-10-22 NOTE — CARE PLAN
The patient's goals for the shift include to get this abscess out and drained    The clinical goals for the shift include Patient oxygen levels will remain above 92%    Over the shift, the patient did   Problem: Respiratory  Goal: Minimal/no exertional discomfort or dyspnea this shift  Outcome: Progressing  Flowsheets (Taken 10/22/2024 0125)  Minimal/no exertional discomfort or dyspnea this shift: Positioning to promote ventilation/comfort     Problem: Respiratory  Goal: No signs of respiratory distress (eg. Use of accessory muscles. Peds grunting)  Outcome: Progressing     Problem: Chronic Conditions and Co-morbidities  Goal: Patient's chronic conditions and co-morbidity symptoms are monitored and maintained or improved  Outcome: Progressing    make progress toward the following goals.

## 2024-10-22 NOTE — OP NOTE
INCISION AND DRAINAGE RIGHT NECK AND EXTRACTION OF TWO TEETH (R) Operative Note     Date: 10/21/2024  OR Location: [facility] OR    Name: Giovanni Lockhart Jr., : 1975, Age: 49 y.o., MRN: [id number], Sex: male    Diagnosis  Pre-op Diagnosis      * Abscess of neck [L02.11] Post-op Diagnosis     * Abscess of neck [L02.11]     Procedures  INCISION AND DRAINAGE RIGHT NECK AND EXTRACTION OF TWO TEETH  63[address] - [place] INCISION & DRAINAGE ABSCESS COMPLICATED/MULTIPLE      Surgeons      * Wally Goodwin - Primary    Resident/Fellow/Other Assistant:  Surgeons and Role:     * Nathan Marroquin DMD, MD - Assisting     * Jake Mondragon DMD - Assisting    Procedure Summary  Anesthesia: General  ASA: III  Anesthesia Staff: Anesthesiologist: Megan Johnson MD  Anesthesia Resident: Jennifer Davis MD  Estimated Blood Loss: 5mL  Intra-op Medications:   Administrations occurring from  to  on 10/21/24:   Medication Name Total Dose   sodium chloride 0.9 % irrigation solution 1,000 mL   lidocaine-epinephrine (Xylocaine W/EPI) 1 %-1:100,000 injection 6 mL   polymyxin B 500,000 Units in sodium chloride 0.9% 1,000 mL irrigation Cannot be calculated   dexAMETHasone (Decadron) injection 8 mg Cannot be calculated   enoxaparin (Lovenox) syringe 40 mg Cannot be calculated   esmolol (Brevibloc) injection 60 mg   fentaNYL (Sublimaze) injection 50 mcg/mL 150 mcg   LR infusion Cannot be calculated   levoFLOXacin (Levaquin) 750 mg in dextrose 5%  mL Cannot be calculated   lidocaine (Xylocaine) injection 2 % 30 mg   metroNIDAZOLE (Flagyl) 500 mg in sodium chloride (iso)  mL Cannot be calculated   midazolam PF (Versed) injection 1 mg/mL 2 mg   ondansetron (Zofran) 2 mg/mL injection 4 mg   oral hydration solution 250 mL Cannot be calculated   propofol (Diprivan) injection 10 mg/mL 250 mg   rocuronium (ZeMuron) 50 mg/5 mL injection 60 mg   sugammadex (Bridion) 200 mg/2 mL injection 400 mg              Anesthesia Record            "    Intraprocedure I/O Totals       None           Specimen:   ID Type Source Tests Collected by Time   A : RIGHT INTRAORAL PUS Swab ABSCESS FUNGAL CULTURE/SMEAR, TISSUE/WOUND CULTURE/SMEAR Wally Goodwin MD, DDS 10/21/2024 2002        Staff:   Scrub Person: Marine  Circulator: Katelyn  Circulator: Seng  Circulator: Nory           Findings: Soft submandibular space with no swelling. Copious intraoral drainage from right third molar. Non restorable #31 and #32.     Indications: Giovanni Lockhart Jr. is an 49 y.o. male who is having surgery for Abscess of neck [L02.11].     The patient was seen in the preoperative area. The risks, benefits, complications, treatment options, non-operative alternatives, expected recovery and outcomes were discussed with the patient. The possibilities of reaction to medication, pulmonary aspiration, injury to surrounding structures, bleeding, recurrent infection, the need for additional procedures, failure to diagnose a condition, and creating a complication requiring transfusion or operation were discussed with the patient. The patient concurred with the proposed plan, giving informed consent.  The site of surgery was properly noted/marked if necessary per policy. The patient has been actively warmed in preoperative area. Preoperative antibiotics have been ordered and given within 2 hours of incision. Venous thrombosis prophylaxis are not indicated.    Procedure Details:   The patient was greeted in the pre-op holding area, where all preoperative risks and complications were reviewed. Later, the patient was brought into the operating room by the anesthesia staff and was placed in the supine position. A \"Time out\" was performed to confirmed patient's identity and the procedure to be performed. The patient was then induced for general anesthesia and intubated with a oral endotracheal tube. Following intubation, the oral endotracheal tube was secured by the anesthesia team. The " patient was prepped and draped in standard oral and maxillofacial surgery fashion for and extraoral incision and drainage.     The site of incision at the right neck was then marked. 1% lidocaine with 1:100K epi was administered via subcutaneous infiltration at the planned incision site. A preincision pause was performed. A15 blade was then used to incise skin. A bovie was used to control any bleeding vessels and proceed with dissection through subcutaneous tissue and platysma. A Mandi was then used to blunty dissect down to inferior border of mandible. No purulence expressed, the submandibular space was soft.    Attention was then turned intraorally. Sulcular incision with distobuccal release made with 15 blade. Full thickness mucoperiosteal flap developed with #9. The dissection continued on the buccal and lingual, copious purulence expressed from the lingual aspect. A culture swab was obtained.     Using rongeurs, #31 was extracted. Using a surgical handpiece, bone was removed surrounding the right third molar. The right third molar was then sectioned and split using a dental elevator. The distal and mesial portions of the right third molar were then elevated and extracted using dental forceps. Copious amounts of irrigation was used to clean the extraction site. A 3-0 chromic gut suture was placed to re approximate tissues.     The neck incision was closed using 4-0 vicryl sutures in the deep dermal layer with 5-0 fast gut sutures in the skin. Bacitracin ointment was placed over extra oral wound. Telfa island placed over extra oral wound.     The oral cavity was suctioned and an OG tube was placed with the gastric contents suctioned.    Care of the patient was then turned over to the anesthesia team. The patient was emerged from anesthesia, extubated and was taken to PACU in stable condition.     Dr. Goodwin was present for all critical portions of the case.     Complications:  None; patient tolerated the  procedure well.    Disposition: PACU - hemodynamically stable.  Condition: stable       Attending Attestation:     Wally Goodwin  Phone Number: [phone]

## 2024-10-22 NOTE — ANESTHESIA PROCEDURE NOTES
Airway  Date/Time: 10/21/2024 7:45 PM  Urgency: elective    Airway not difficult    Staffing  Performed: resident   Authorized by: Eugenia Nuñez MD    Performed by: Jennifer Davis MD  Patient location during procedure: OR    Indications and Patient Condition  Indications for airway management: anesthesia  Spontaneous Ventilation: absent  Sedation level: deep  Preoxygenated: yes  Patient position: sniffing  Mask difficulty assessment: 1 - vent by mask  Planned trial extubation    Final Airway Details  Final airway type: endotracheal airway      Successful airway: ETT  Cuffed: yes   Successful intubation technique: video laryngoscopy  Facilitating devices/methods: intubating stylet  Endotracheal tube insertion site: oral  Blade: Armen  Blade size: #3  ETT size (mm): 7.0  Cormack-Lehane Classification: grade I - full view of glottis  Placement verified by: chest auscultation and capnometry   Measured from: teeth  ETT to teeth (cm): 21  Number of attempts at approach: 1

## 2024-10-23 ENCOUNTER — PATIENT OUTREACH (OUTPATIENT)
Dept: PRIMARY CARE | Facility: CLINIC | Age: 49
End: 2024-10-23
Payer: COMMERCIAL

## 2024-10-23 LAB
BACTERIA SPEC CULT: ABNORMAL
FUNGUS SPEC CULT: ABNORMAL
FUNGUS SPEC FUNGUS STN: ABNORMAL
GRAM STN SPEC: ABNORMAL
GRAM STN SPEC: ABNORMAL

## 2024-10-23 NOTE — PROGRESS NOTES
Discharge Facility:[facility]  Discharge Diagnosis:Abscess of right neck mandible   Admission Date:10/20/24  Discharge Date: 10/22/24    PCP Appointment Date:10/30/24  Specialist Appointment Date:   Hospital Encounter and Summary Linked: Yes  See discharge assessment below for further details  Engagement  Call Start Time: 0139 (oxycodone 5mg levaquin 250mg flaygl 500mg bactracin) (10/23/2024  1:39 PM)    Medications  Medications reviewed with patient/caregiver?: Yes (10/23/2024  1:39 PM)  Is the patient having any side effects they believe may be caused by any medication additions or changes?: No (10/23/2024  1:39 PM)  Does the patient have all medications ordered at discharge?: Yes (10/23/2024  1:39 PM)  Is the patient taking all medications as directed (includes completed medication regime)?: Yes (10/23/2024  1:39 PM)  Medication Comments: taking all new meds (10/23/2024  1:39 PM)    Appointments  Does the patient have a primary care provider?: Yes (10/23/2024  1:39 PM)  Care Management Interventions: Verified appointment date/time/provider (10/23/2024  1:39 PM)    Self Management  Has home health visited the patient within 72 hours of discharge?: Not applicable (10/23/2024  1:39 PM)  Has all Durable Medical Equipment (DME) been delivered?: No (10/23/2024  1:39 PM)    Patient Teaching  Does the patient have access to their discharge instructions?: Yes (10/23/2024  1:39 PM)  Care Management Interventions: Reviewed instructions with patient (10/23/2024  1:39 PM)  What is the patient's perception of their health status since discharge?: Improving (10/23/2024  1:39 PM)  Is the patient/caregiver able to teach back the hierarchy of who to call/visit for symptoms/problems? PCP, Specialist, Home Health nurse, Urgent Care, ED, 911: Yes (10/23/2024  1:39 PM)  Patient/Caregiver Education Comments: patient stated that he has some sutures . (10/23/2024  1:39 PM)    Wrap Up  Wrap Up Additional Comments: Discused discharge patient  stated that hes doing better and has no discomfort at this time. (10/23/2024  1:39 PM)  Call End Time: 0148 (10/23/2024  1:39 PM)

## 2024-10-25 NOTE — PATIENT INSTRUCTIONS
"TO [facility] ED now for evaluation of trismus and suspicion for mass/abscess     Please send me a MyChart message if you have any questions or concerns.  FOR NON URGENT questions only.  Allow up to 72 hours for response.    If you have prescription issues or other questions you can email   Andrei Blackburn, [facility] Coordinator, at   [email or web address]     Rest and drink plenty of fluids    Tylenol and or motrin as needed for pain and fever (unless you have been told not to take these because of your personal medical history)    Discussed options and precautions (complaint specific and may include)  Viral versus bacterial infection; use of medications; possible side effects; appropriate over-the-counter medications; possible complications and /or when to follow-up.    if sent for in person care at  or ED,  it was explained why and failure to have \"higher level of care\" further evaluation, and treatment today may lead, but is not limited to negative outcomes, permanent disability, or even death.     All red flags requiring in person care were discussed.    If not sent for in person care today, follow-up with your PCP in 2-3 days, sooner if not  improving.    Follow-up immediately if symptoms worsen. If experiencing symptoms including but not limited to lethargy / chest pain / weakness / dizziness / difficulty breathing please call 911 or go to the emergency department for immediate care.     All patient's questions were answered. Patient has good decision making capacity.  They are alert to person, place, time and situation.  Patient has the ability to communicate choice, understand information, consequences, and reason rationally.      ____________________________________________________________________________________________________________  To connect with a new PCP please visit [email or web address] or call [phone]                          "

## 2024-10-30 ENCOUNTER — APPOINTMENT (OUTPATIENT)
Dept: PRIMARY CARE | Facility: CLINIC | Age: 49
End: 2024-10-30
Payer: COMMERCIAL

## 2024-10-30 VITALS
OXYGEN SATURATION: 97 % | HEIGHT: 64 IN | BODY MASS INDEX: 27.14 KG/M2 | SYSTOLIC BLOOD PRESSURE: 109 MMHG | WEIGHT: 159 LBS | HEART RATE: 83 BPM | RESPIRATION RATE: 14 BRPM | DIASTOLIC BLOOD PRESSURE: 68 MMHG

## 2024-10-30 DIAGNOSIS — D64.9 ANEMIA, UNSPECIFIED TYPE: ICD-10-CM

## 2024-10-30 DIAGNOSIS — Z00.00 ANNUAL PHYSICAL EXAM: Primary | ICD-10-CM

## 2024-10-30 DIAGNOSIS — Z12.5 PROSTATE CANCER SCREENING: ICD-10-CM

## 2024-10-30 DIAGNOSIS — Z09 HOSPITAL DISCHARGE FOLLOW-UP: Primary | ICD-10-CM

## 2024-10-30 DIAGNOSIS — M27.2 ABSCESS OF MANDIBLE: ICD-10-CM

## 2024-10-30 PROCEDURE — 99495 TRANSJ CARE MGMT MOD F2F 14D: CPT | Performed by: FAMILY MEDICINE

## 2024-10-30 PROCEDURE — 3008F BODY MASS INDEX DOCD: CPT | Performed by: FAMILY MEDICINE

## 2024-10-30 ASSESSMENT — ENCOUNTER SYMPTOMS
DEPRESSION: 0
LOSS OF SENSATION IN FEET: 0
OCCASIONAL FEELINGS OF UNSTEADINESS: 0

## 2024-11-01 ENCOUNTER — PATIENT OUTREACH (OUTPATIENT)
Dept: PRIMARY CARE | Facility: CLINIC | Age: 49
End: 2024-11-01
Payer: COMMERCIAL

## 2024-11-19 ENCOUNTER — PATIENT OUTREACH (OUTPATIENT)
Dept: PRIMARY CARE | Facility: CLINIC | Age: 49
End: 2024-11-19
Payer: COMMERCIAL

## 2024-11-20 ENCOUNTER — APPOINTMENT (OUTPATIENT)
Dept: PRIMARY CARE | Facility: CLINIC | Age: 49
End: 2024-11-20
Payer: COMMERCIAL

## 2025-01-16 ENCOUNTER — PATIENT OUTREACH (OUTPATIENT)
Dept: PRIMARY CARE | Facility: CLINIC | Age: 50
End: 2025-01-16
Payer: COMMERCIAL

## 2025-04-08 ENCOUNTER — APPOINTMENT (OUTPATIENT)
Dept: PRIMARY CARE | Facility: CLINIC | Age: 50
End: 2025-04-08

## 2025-04-08 VITALS
SYSTOLIC BLOOD PRESSURE: 98 MMHG | DIASTOLIC BLOOD PRESSURE: 60 MMHG | OXYGEN SATURATION: 96 % | BODY MASS INDEX: 29.7 KG/M2 | TEMPERATURE: 98 F | WEIGHT: 173 LBS | HEART RATE: 80 BPM

## 2025-04-08 DIAGNOSIS — M25.552 LEFT HIP PAIN: Primary | ICD-10-CM

## 2025-04-08 PROCEDURE — 4004F PT TOBACCO SCREEN RCVD TLK: CPT | Performed by: FAMILY MEDICINE

## 2025-04-08 PROCEDURE — 99214 OFFICE O/P EST MOD 30 MIN: CPT | Performed by: FAMILY MEDICINE

## 2025-04-08 RX ORDER — MELOXICAM 15 MG/1
15 TABLET ORAL DAILY PRN
Qty: 30 TABLET | Refills: 0 | Status: SHIPPED | OUTPATIENT
Start: 2025-04-08

## 2025-04-08 NOTE — PATIENT INSTRUCTIONS
X-ray.  Meloxicam as needed.  Will consider physical therapy referral after reviewing x-ray results.  Call, send message through JollyDeck, or return to office prn if these symptoms worsen or fail to improve as anticipated.     Schedule annual wellness visit.

## 2025-04-08 NOTE — PROGRESS NOTES
Subjective   Patient ID: Giovanni Lockhart Jr. is a 50 y.o. male who presents for Hip Pain (Left hip ).    HPI   Left hip pain (lateral aspect) x 1 month.  No acute trauma or increased activity prior to onset.  Described as dull.  Intermittent (occurred 3 times over the last month).  Worse w/nothing.  Improved w/massage.  Max 6-7/10.  Now 0/10.    Review of Systems  No other complaints.     Objective   BP 98/60 (BP Location: Left arm, Patient Position: Sitting, BP Cuff Size: Adult)   Pulse 80   Temp 36.7 °C (98 °F) (Temporal)   Wt 78.5 kg (173 lb)   SpO2 96%   BMI 29.70 kg/m²     Physical Exam  Constitutional:       General: He is not in acute distress.     Appearance: He is overweight.   Musculoskeletal:      Left hip: Tenderness (mild, lateral aspect) present. Normal range of motion.   Neurological:      Mental Status: He is oriented to person, place, and time.   Psychiatric:         Mood and Affect: Mood normal.         Behavior: Behavior normal.     Assessment/Plan   Diagnoses and all orders for this visit:  Left hip pain  -     XR hip left with pelvis when performed 2 or 3 views; Future  -     meloxicam (Mobic) 15 mg tablet; Take 1 tablet (15 mg) by mouth once daily as needed for mild pain (1 - 3) or moderate pain (4 - 6).    X-ray.  Meloxicam as needed.  Will consider physical therapy referral after reviewing x-ray results.  Call, send message through [employer], or return to office prn if these symptoms worsen or fail to improve as anticipated.     Schedule annual wellness visit.    [facility] Counseling: I discussed with the patient the risks of risankizumab-rzaa including but not limited to immunosuppression, and serious infections.  The patient understands that monitoring is required including a PPD at baseline and must alert us or the primary physician if symptoms of infection or other concerning signs are noted.

## 2025-04-10 ENCOUNTER — OFFICE VISIT (OUTPATIENT)
Dept: PRIMARY CARE | Facility: CLINIC | Age: 50
End: 2025-04-10

## 2025-04-10 VITALS
OXYGEN SATURATION: 94 % | HEIGHT: 65 IN | BODY MASS INDEX: 29.16 KG/M2 | DIASTOLIC BLOOD PRESSURE: 64 MMHG | SYSTOLIC BLOOD PRESSURE: 130 MMHG | WEIGHT: 175 LBS | TEMPERATURE: 98 F | HEART RATE: 87 BPM

## 2025-04-10 DIAGNOSIS — Z23 ENCOUNTER FOR IMMUNIZATION: ICD-10-CM

## 2025-04-10 DIAGNOSIS — Z72.0 TOBACCO USE: ICD-10-CM

## 2025-04-10 DIAGNOSIS — Z82.49 FAMILY HISTORY OF HEART ATTACK: ICD-10-CM

## 2025-04-10 DIAGNOSIS — Z00.00 ANNUAL PHYSICAL EXAM: Primary | ICD-10-CM

## 2025-04-10 PROBLEM — L02.11 NECK ABSCESS: Status: RESOLVED | Noted: 2024-10-20 | Resolved: 2025-04-10

## 2025-04-10 PROBLEM — L02.11 ABSCESS OF NECK: Status: RESOLVED | Noted: 2024-10-20 | Resolved: 2025-04-10

## 2025-04-10 PROBLEM — R13.10 DYSPHAGIA: Status: RESOLVED | Noted: 2024-10-21 | Resolved: 2025-04-10

## 2025-04-10 PROCEDURE — 90677 PCV20 VACCINE IM: CPT | Performed by: FAMILY MEDICINE

## 2025-04-10 PROCEDURE — 4004F PT TOBACCO SCREEN RCVD TLK: CPT | Performed by: FAMILY MEDICINE

## 2025-04-10 PROCEDURE — 99396 PREV VISIT EST AGE 40-64: CPT | Performed by: FAMILY MEDICINE

## 2025-04-10 PROCEDURE — 90750 HZV VACC RECOMBINANT IM: CPT | Performed by: FAMILY MEDICINE

## 2025-04-10 PROCEDURE — 3008F BODY MASS INDEX DOCD: CPT | Performed by: FAMILY MEDICINE

## 2025-04-10 PROCEDURE — 90472 IMMUNIZATION ADMIN EACH ADD: CPT | Performed by: FAMILY MEDICINE

## 2025-04-10 PROCEDURE — 90471 IMMUNIZATION ADMIN: CPT | Performed by: FAMILY MEDICINE

## 2025-04-10 ASSESSMENT — PAIN SCALES - GENERAL: PAINLEVEL_OUTOF10: 0-NO PAIN

## 2025-04-10 ASSESSMENT — PATIENT HEALTH QUESTIONNAIRE - PHQ9
SUM OF ALL RESPONSES TO PHQ9 QUESTIONS 1 AND 2: 0
2. FEELING DOWN, DEPRESSED OR HOPELESS: NOT AT ALL
1. LITTLE INTEREST OR PLEASURE IN DOING THINGS: NOT AT ALL

## 2025-04-10 NOTE — PROGRESS NOTES
"Subjective   Patient ID: Giovanni Lockhart Jr. is a 50 y.o. male who presents for Annual Exam.    HPI   Patient's health is described as fair.  Regular dental visits: Yes.  Dental hygiene (brushing/flossing) regularly performed: Yes.  Corrective lenses: Yes.  Vision problems: No.  Last eye exam within 1 year: No.  Hearing loss: No.  Requests audiology referral: No.  Immunizations up to date: No (shingrix, pcv20).  Healthy diet: Yes.  Regular exercise: Yes.  Trying to lose weight: No.  Requests nutrition/weight loss referral: No.  Sexually active: Yes.  Using contraception: No (partner s/p hysterectomy).  Requests STD screening: No.  Colon cancer screening up to date: Yes (2023, repeat in 3-5 yrs).  Lung cancer screening up to date: N/A.  Hepatitis C screening up to date: Yes.    Did not get labs drawn as previously ordered.    Reviewed/Updated Active problem list, PMH, PSH, FH, SH, Meds, Allergies.    Review of Systems  No other complaints.     Objective   /64 (BP Location: Left arm, Patient Position: Sitting, BP Cuff Size: Adult)   Pulse 87   Temp 36.7 °C (98 °F) (Temporal)   Ht 1.638 m (5' 4.5\")   Wt 79.4 kg (175 lb)   SpO2 94%   BMI 29.57 kg/m²     Physical Exam  Constitutional:       General: He is not in acute distress.     Appearance: He is overweight.   HENT:      Head: Normocephalic.      Right Ear: Tympanic membrane normal.      Left Ear: Tympanic membrane normal.      Mouth/Throat:      Pharynx: Oropharynx is clear. No oropharyngeal exudate or posterior oropharyngeal erythema.   Eyes:      Extraocular Movements: Extraocular movements intact.      Conjunctiva/sclera: Conjunctivae normal.      Pupils: Pupils are equal, round, and reactive to light.   Neck:      Thyroid: No thyromegaly.      Vascular: No carotid bruit.   Cardiovascular:      Rate and Rhythm: Normal rate and regular rhythm.      Heart sounds: Normal heart sounds. No murmur heard.     No friction rub. No gallop.   Pulmonary:      Effort: " Pulmonary effort is normal.      Breath sounds: Normal breath sounds. No wheezing, rhonchi or rales.   Abdominal:      General: Bowel sounds are normal. There is no distension.      Palpations: Abdomen is soft. There is no mass.      Tenderness: There is no abdominal tenderness. There is no guarding or rebound.   Genitourinary:     Prostate: No nodules present.   Lymphadenopathy:      Cervical: No cervical adenopathy.   Skin:     Coloration: Skin is not jaundiced or pale.   Neurological:      General: No focal deficit present.      Mental Status: He is oriented to person, place, and time.   Psychiatric:         Mood and Affect: Mood normal.         Behavior: Behavior normal.     Assessment/Plan   Diagnoses and all orders for this visit:  Annual physical exam  Family history of heart attack  -     CT cardiac scoring wo IV contrast; Future  Tobacco use  -     CT cardiac scoring wo IV contrast; Future  Encounter for immunization  -     Zoster vaccine, recombinant, adult (SHINGRIX)  -     Pneumococcal conjugate vaccine, 20-valent (PREVNAR 20)    Get fasting labs as previously ordered.  Coronary calcium score ordered as discussed.  Recommend smoking cessation.  Call Tobacco Quit Line (3-087-QUIT-NOW) for resources and support.   Shingrix #1, [id number] provided.  Return in 2-6 months for shingrix #2.    F/U 1 year: Annual wellness visit.

## 2025-04-10 NOTE — PATIENT INSTRUCTIONS
Get fasting labs. As previously ordered.  Coronary calcium score ordered as discussed.  Recommend smoking cessation.  Call Tobacco Quit Line (1-385-QUIT-NOW) for resources and support.   Shingrix #1, [id number] provided.  Return in 2-6 months for shingrix #2.    F/U 1 year: Annual wellness visit.    Lab services: [address]  Hours: M-F 7:15a-6:00p  Phone: [phone], Option 1

## 2025-04-30 ENCOUNTER — OFFICE VISIT (OUTPATIENT)
Dept: PRIMARY CARE | Facility: CLINIC | Age: 50
End: 2025-04-30
Payer: COMMERCIAL

## 2025-04-30 VITALS
DIASTOLIC BLOOD PRESSURE: 68 MMHG | BODY MASS INDEX: 28.9 KG/M2 | WEIGHT: 171 LBS | HEART RATE: 75 BPM | SYSTOLIC BLOOD PRESSURE: 117 MMHG | OXYGEN SATURATION: 97 % | TEMPERATURE: 98 F

## 2025-04-30 DIAGNOSIS — M54.50 ACUTE LOW BACK PAIN WITHOUT SCIATICA, UNSPECIFIED BACK PAIN LATERALITY: Primary | ICD-10-CM

## 2025-04-30 DIAGNOSIS — R31.29 MICROHEMATURIA: ICD-10-CM

## 2025-04-30 LAB
POC APPEARANCE, URINE: CLEAR
POC BILIRUBIN, URINE: NEGATIVE
POC BLOOD, URINE: ABNORMAL
POC COLOR, URINE: YELLOW
POC GLUCOSE, URINE: NEGATIVE MG/DL
POC KETONES, URINE: NEGATIVE MG/DL
POC LEUKOCYTES, URINE: NEGATIVE
POC NITRITE,URINE: NEGATIVE
POC PH, URINE: 6 PH
POC PROTEIN, URINE: NEGATIVE MG/DL
POC SPECIFIC GRAVITY, URINE: 1.02
POC UROBILINOGEN, URINE: 0.2 EU/DL

## 2025-04-30 PROCEDURE — 4004F PT TOBACCO SCREEN RCVD TLK: CPT | Performed by: FAMILY MEDICINE

## 2025-04-30 PROCEDURE — 99214 OFFICE O/P EST MOD 30 MIN: CPT | Performed by: FAMILY MEDICINE

## 2025-04-30 PROCEDURE — 81003 URINALYSIS AUTO W/O SCOPE: CPT | Performed by: FAMILY MEDICINE

## 2025-04-30 RX ORDER — METHYLPREDNISOLONE 4 MG/1
TABLET ORAL
Qty: 21 TABLET | Refills: 0 | Status: SHIPPED | OUTPATIENT
Start: 2025-04-30 | End: 2025-04-30 | Stop reason: SDUPTHER

## 2025-04-30 RX ORDER — METHOCARBAMOL 500 MG/1
500 TABLET, FILM COATED ORAL 4 TIMES DAILY PRN
Qty: 30 TABLET | Refills: 0 | Status: SHIPPED | OUTPATIENT
Start: 2025-04-30 | End: 2025-04-30 | Stop reason: SDUPTHER

## 2025-04-30 RX ORDER — METHOCARBAMOL 500 MG/1
500 TABLET, FILM COATED ORAL 4 TIMES DAILY PRN
Qty: 30 TABLET | Refills: 0 | Status: SHIPPED | OUTPATIENT
Start: 2025-04-30

## 2025-04-30 RX ORDER — METHYLPREDNISOLONE 4 MG/1
TABLET ORAL
Qty: 21 TABLET | Refills: 0 | Status: SHIPPED | OUTPATIENT
Start: 2025-04-30 | End: 2025-05-06

## 2025-04-30 ASSESSMENT — PAIN SCALES - GENERAL: PAINLEVEL_OUTOF10: 5

## 2025-04-30 NOTE — PATIENT INSTRUCTIONS
Urine microscopy sent.  Oral steroid provided (do not take NSAIDs while taking steroid).  Muscle relaxer as needed.  Call or send message through [employer] if these symptoms worsen or fail to improve as anticipated.      F/U 1 year: Annual wellness visit.

## 2025-04-30 NOTE — PROGRESS NOTES
Subjective   Patient ID: Giovanni Lockhart Jr. is a 50 y.o. male who presents for Back Pain (Lower back pain - start today ).    HPI   LBP started 1 hour ago.  No trauma or increased activity prior to onset.  Described as dull.  Intermittent.  Worse w/standing straight up.  Improved w/bending over.  Max 7/10.  Ave 5/10.  Now 0/10.  No radiation to LEs.  No saddle anesthesia or incontinence.  No fevers.    He wants to make sure he doesn't have a kidney stone (heard that kidney stones can cause back pain).    Review of Systems  No other complaints.     Objective   /68 (BP Location: Left arm, Patient Position: Sitting, BP Cuff Size: Small adult)   Pulse 75   Temp 36.7 °C (98 °F) (Temporal)   Wt 77.6 kg (171 lb)   SpO2 97%   BMI 28.90 kg/m²     Physical Exam  Constitutional:       General: He is not in acute distress.     Appearance: He is overweight.   Cardiovascular:      Rate and Rhythm: Normal rate and regular rhythm.      Heart sounds: Normal heart sounds. No murmur heard.     No friction rub. No gallop.   Pulmonary:      Effort: Pulmonary effort is normal.      Breath sounds: Normal breath sounds. No wheezing, rhonchi or rales.   Abdominal:      General: Bowel sounds are normal. There is no distension.      Palpations: Abdomen is soft. There is no mass.      Tenderness: There is no abdominal tenderness. There is no right CVA tenderness, left CVA tenderness, guarding or rebound.   Musculoskeletal:      Lumbar back: Normal range of motion (mild pain w/right lateral twist and right bending). Negative right straight leg raise test and negative left straight leg raise test.   Neurological:      Mental Status: He is oriented to person, place, and time.   Psychiatric:         Mood and Affect: Mood normal.         Behavior: Behavior normal.       POCT: UA: Trace blood,  Otherwise negative.    Assessment/Plan   Diagnoses and all orders for this visit:  Acute low back pain without sciatica, unspecified back pain  laterality  -     POCT UA Automated manually resulted  -     methylPREDNISolone (Medrol Dospak) 4 mg tablets; Take as directed on package.  -     methocarbamol (Robaxin) 500 mg tablet; Take 1 tablet (500 mg) by mouth 4 times a day as needed for muscle spasms.  Microhematuria  -     Microscopic Only, Urine    Urine microscopy sent.  Oral steroid provided (do not take NSAIDs while taking steroid).  Muscle relaxer as needed.  Call or send message through [employer] if these symptoms worsen or fail to improve as anticipated.      F/U 1 year: Annual wellness visit.

## 2025-05-01 LAB
BACTERIA #/AREA URNS HPF: NORMAL /HPF
HYALINE CASTS #/AREA URNS LPF: NORMAL /LPF
RBC #/AREA URNS HPF: NORMAL /HPF
SERVICE CMNT-IMP: NORMAL
SQUAMOUS #/AREA URNS HPF: NORMAL /HPF
WBC #/AREA URNS HPF: NORMAL /HPF

## 2025-09-03 ENCOUNTER — APPOINTMENT (OUTPATIENT)
Dept: PRIMARY CARE | Facility: CLINIC | Age: 50
End: 2025-09-03
Payer: COMMERCIAL

## 2025-09-03 ASSESSMENT — PATIENT HEALTH QUESTIONNAIRE - PHQ9
SUM OF ALL RESPONSES TO PHQ9 QUESTIONS 1 AND 2: 0
2. FEELING DOWN, DEPRESSED OR HOPELESS: NOT AT ALL
1. LITTLE INTEREST OR PLEASURE IN DOING THINGS: NOT AT ALL

## (undated) DEVICE — DRESSING, NON-ADHERENT, TELFA, OUCHLESS, 3 X 8 IN, STERILE

## (undated) DEVICE — WOUND SYSTEM, DEBRIDEMENT & CLEANING, O.R DUOPAK

## (undated) DEVICE — CATHETER, URETHRAL, PEZZER, 3 CM HEAD, 34 FR, LATEX

## (undated) DEVICE — NEEDLE, ELECTRODE, ELECTROSURGICAL, INSULATED

## (undated) DEVICE — COVER, CART, 45 X 27 X 48 IN, CLEAR

## (undated) DEVICE — SPONGE GAUZE, XRAY SC+RFID, 4X4 16 PLY, STERILE

## (undated) DEVICE — MANIFOLD, 4 PORT NEPTUNE STANDARD

## (undated) DEVICE — DRESSING, GAUZE, DRAIN SPONGE, 6 PLY, EXCILON, 4 X 4 IN, STERILE

## (undated) DEVICE — Device

## (undated) DEVICE — DRAPE, MAGENTIC INSTRUMENT, 12X16

## (undated) DEVICE — SUTURE, VICRYL, 4-0, 18 IN, UNDYED BR PS-2

## (undated) DEVICE — REST, HEAD, BAGEL, 9 IN

## (undated) DEVICE — DRESSING, SPONGE, GAUZE, CURITY, 4 X 4 IN, STERILE

## (undated) DEVICE — DRILL BIT, 1.6MM, CROSS-CUT FISSURE